# Patient Record
Sex: FEMALE | Race: WHITE | Employment: OTHER | URBAN - NONMETROPOLITAN AREA
[De-identification: names, ages, dates, MRNs, and addresses within clinical notes are randomized per-mention and may not be internally consistent; named-entity substitution may affect disease eponyms.]

---

## 2024-05-28 ENCOUNTER — HOSPITAL ENCOUNTER (INPATIENT)
Age: 69
LOS: 7 days | Discharge: HOME | DRG: 417 | End: 2024-06-04
Attending: STUDENT IN AN ORGANIZED HEALTH CARE EDUCATION/TRAINING PROGRAM | Admitting: STUDENT IN AN ORGANIZED HEALTH CARE EDUCATION/TRAINING PROGRAM
Payer: MEDICARE

## 2024-05-28 DIAGNOSIS — E87.6 HYPOKALEMIA: ICD-10-CM

## 2024-05-28 DIAGNOSIS — K80.50 CHOLEDOCHOLITHIASIS: ICD-10-CM

## 2024-05-28 DIAGNOSIS — R60.9 EDEMA, UNSPECIFIED TYPE: Primary | ICD-10-CM

## 2024-05-28 LAB
ALBUMIN SERPL BCG-MCNC: 4.3 G/DL (ref 3.5–5.1)
ALP SERPL-CCNC: 74 U/L (ref 38–126)
ALT SERPL W/O P-5'-P-CCNC: 234 U/L (ref 11–66)
ANION GAP SERPL CALC-SCNC: 12 MEQ/L (ref 8–16)
AST SERPL-CCNC: 138 U/L (ref 5–40)
BASOPHILS ABSOLUTE: 0 THOU/MM3 (ref 0–0.1)
BASOPHILS NFR BLD AUTO: 0.1 %
BILIRUB SERPL-MCNC: 0.7 MG/DL (ref 0.3–1.2)
BUN SERPL-MCNC: 16 MG/DL (ref 7–22)
CALCIUM SERPL-MCNC: 9.3 MG/DL (ref 8.5–10.5)
CHLORIDE SERPL-SCNC: 101 MEQ/L (ref 98–111)
CO2 SERPL-SCNC: 26 MEQ/L (ref 23–33)
CREAT SERPL-MCNC: 0.5 MG/DL (ref 0.4–1.2)
DEPRECATED RDW RBC AUTO: 43.8 FL (ref 35–45)
EKG ATRIAL RATE: 71 BPM
EKG P AXIS: 70 DEGREES
EKG P-R INTERVAL: 150 MS
EKG Q-T INTERVAL: 402 MS
EKG QRS DURATION: 74 MS
EKG QTC CALCULATION (BAZETT): 436 MS
EKG R AXIS: -40 DEGREES
EKG T AXIS: 36 DEGREES
EKG VENTRICULAR RATE: 71 BPM
EOSINOPHIL NFR BLD AUTO: 0 %
EOSINOPHILS ABSOLUTE: 0 THOU/MM3 (ref 0–0.4)
ERYTHROCYTE [DISTWIDTH] IN BLOOD BY AUTOMATED COUNT: 12.1 % (ref 11.5–14.5)
GFR SERPL CREATININE-BSD FRML MDRD: > 90 ML/MIN/1.73M2
GLUCOSE SERPL-MCNC: 151 MG/DL (ref 70–108)
HCT VFR BLD AUTO: 47 % (ref 37–47)
HGB BLD-MCNC: 15.5 GM/DL (ref 12–16)
IMM GRANULOCYTES # BLD AUTO: 0.08 THOU/MM3 (ref 0–0.07)
IMM GRANULOCYTES NFR BLD AUTO: 0.4 %
LIPASE SERPL-CCNC: > 3000 U/L (ref 5.6–51.3)
LYMPHOCYTES ABSOLUTE: 0.2 THOU/MM3 (ref 1–4.8)
LYMPHOCYTES NFR BLD AUTO: 1.1 %
MCH RBC QN AUTO: 32.3 PG (ref 26–33)
MCHC RBC AUTO-ENTMCNC: 33 GM/DL (ref 32.2–35.5)
MCV RBC AUTO: 97.9 FL (ref 81–99)
MONOCYTES ABSOLUTE: 0.9 THOU/MM3 (ref 0.4–1.3)
MONOCYTES NFR BLD AUTO: 4.5 %
NEUTROPHILS ABSOLUTE: 19.2 THOU/MM3 (ref 1.8–7.7)
NEUTROPHILS NFR BLD AUTO: 93.9 %
NRBC BLD AUTO-RTO: 0 /100 WBC
PLATELET # BLD AUTO: 279 THOU/MM3 (ref 130–400)
PMV BLD AUTO: 10.5 FL (ref 9.4–12.4)
POTASSIUM SERPL-SCNC: 4.7 MEQ/L (ref 3.5–5.2)
PROT SERPL-MCNC: 7.5 G/DL (ref 6.1–8)
RBC # BLD AUTO: 4.8 MILL/MM3 (ref 4.2–5.4)
SODIUM SERPL-SCNC: 139 MEQ/L (ref 135–145)
WBC # BLD AUTO: 20.5 THOU/MM3 (ref 4.8–10.8)

## 2024-05-28 PROCEDURE — 93010 ELECTROCARDIOGRAM REPORT: CPT | Performed by: INTERNAL MEDICINE

## 2024-05-28 PROCEDURE — 6360000002 HC RX W HCPCS

## 2024-05-28 PROCEDURE — 85025 COMPLETE CBC W/AUTO DIFF WBC: CPT

## 2024-05-28 PROCEDURE — 2580000003 HC RX 258: Performed by: SURGERY

## 2024-05-28 PROCEDURE — 6370000000 HC RX 637 (ALT 250 FOR IP)

## 2024-05-28 PROCEDURE — 99223 1ST HOSP IP/OBS HIGH 75: CPT

## 2024-05-28 PROCEDURE — 80053 COMPREHEN METABOLIC PANEL: CPT

## 2024-05-28 PROCEDURE — 36415 COLL VENOUS BLD VENIPUNCTURE: CPT

## 2024-05-28 PROCEDURE — 83690 ASSAY OF LIPASE: CPT

## 2024-05-28 PROCEDURE — 87040 BLOOD CULTURE FOR BACTERIA: CPT

## 2024-05-28 PROCEDURE — 93005 ELECTROCARDIOGRAM TRACING: CPT

## 2024-05-28 PROCEDURE — 1200000003 HC TELEMETRY R&B

## 2024-05-28 RX ORDER — SODIUM CHLORIDE 9 MG/ML
INJECTION, SOLUTION INTRAVENOUS CONTINUOUS
Status: DISCONTINUED | OUTPATIENT
Start: 2024-05-28 | End: 2024-05-29

## 2024-05-28 RX ORDER — ACETAMINOPHEN 650 MG/1
650 SUPPOSITORY RECTAL EVERY 6 HOURS PRN
Status: DISCONTINUED | OUTPATIENT
Start: 2024-05-28 | End: 2024-06-04 | Stop reason: HOSPADM

## 2024-05-28 RX ORDER — POLYETHYLENE GLYCOL 3350 17 G/17G
17 POWDER, FOR SOLUTION ORAL DAILY PRN
Status: DISCONTINUED | OUTPATIENT
Start: 2024-05-28 | End: 2024-06-04 | Stop reason: HOSPADM

## 2024-05-28 RX ORDER — ONDANSETRON 4 MG/1
4 TABLET, ORALLY DISINTEGRATING ORAL EVERY 8 HOURS PRN
Status: DISCONTINUED | OUTPATIENT
Start: 2024-05-28 | End: 2024-06-04 | Stop reason: HOSPADM

## 2024-05-28 RX ORDER — SODIUM CHLORIDE 0.9 % (FLUSH) 0.9 %
10 SYRINGE (ML) INJECTION EVERY 12 HOURS SCHEDULED
Status: DISCONTINUED | OUTPATIENT
Start: 2024-05-28 | End: 2024-06-04 | Stop reason: HOSPADM

## 2024-05-28 RX ORDER — POTASSIUM CHLORIDE 7.45 MG/ML
10 INJECTION INTRAVENOUS PRN
Status: DISCONTINUED | OUTPATIENT
Start: 2024-05-28 | End: 2024-06-04 | Stop reason: HOSPADM

## 2024-05-28 RX ORDER — ONDANSETRON 2 MG/ML
4 INJECTION INTRAMUSCULAR; INTRAVENOUS EVERY 6 HOURS PRN
Status: DISCONTINUED | OUTPATIENT
Start: 2024-05-28 | End: 2024-06-04 | Stop reason: HOSPADM

## 2024-05-28 RX ORDER — POTASSIUM CHLORIDE 20 MEQ/1
40 TABLET, EXTENDED RELEASE ORAL PRN
Status: DISCONTINUED | OUTPATIENT
Start: 2024-05-28 | End: 2024-06-04 | Stop reason: HOSPADM

## 2024-05-28 RX ORDER — SODIUM CHLORIDE 9 MG/ML
INJECTION, SOLUTION INTRAVENOUS PRN
Status: DISCONTINUED | OUTPATIENT
Start: 2024-05-28 | End: 2024-06-04 | Stop reason: HOSPADM

## 2024-05-28 RX ORDER — AMLODIPINE BESYLATE 5 MG/1
5 TABLET ORAL DAILY
COMMUNITY

## 2024-05-28 RX ORDER — AMLODIPINE BESYLATE 5 MG/1
5 TABLET ORAL DAILY
Status: DISCONTINUED | OUTPATIENT
Start: 2024-05-28 | End: 2024-06-04 | Stop reason: HOSPADM

## 2024-05-28 RX ORDER — ACETAMINOPHEN 325 MG/1
650 TABLET ORAL EVERY 6 HOURS PRN
Status: DISCONTINUED | OUTPATIENT
Start: 2024-05-28 | End: 2024-06-04 | Stop reason: HOSPADM

## 2024-05-28 RX ORDER — MAGNESIUM SULFATE IN WATER 40 MG/ML
2000 INJECTION, SOLUTION INTRAVENOUS PRN
Status: DISCONTINUED | OUTPATIENT
Start: 2024-05-28 | End: 2024-06-04 | Stop reason: HOSPADM

## 2024-05-28 RX ORDER — SODIUM CHLORIDE 0.9 % (FLUSH) 0.9 %
10 SYRINGE (ML) INJECTION PRN
Status: DISCONTINUED | OUTPATIENT
Start: 2024-05-28 | End: 2024-06-04 | Stop reason: HOSPADM

## 2024-05-28 RX ORDER — ENOXAPARIN SODIUM 100 MG/ML
40 INJECTION SUBCUTANEOUS DAILY
Status: DISCONTINUED | OUTPATIENT
Start: 2024-05-29 | End: 2024-06-02

## 2024-05-28 RX ADMIN — SODIUM CHLORIDE: 9 INJECTION, SOLUTION INTRAVENOUS at 21:35

## 2024-05-28 RX ADMIN — HYDROMORPHONE HYDROCHLORIDE 0.5 MG: 1 INJECTION, SOLUTION INTRAMUSCULAR; INTRAVENOUS; SUBCUTANEOUS at 21:41

## 2024-05-28 RX ADMIN — ONDANSETRON 4 MG: 2 INJECTION INTRAMUSCULAR; INTRAVENOUS at 21:42

## 2024-05-28 ASSESSMENT — PAIN SCALES - GENERAL
PAINLEVEL_OUTOF10: 0
PAINLEVEL_OUTOF10: 9

## 2024-05-28 ASSESSMENT — PAIN DESCRIPTION - LOCATION: LOCATION: ABDOMEN

## 2024-05-28 ASSESSMENT — PAIN - FUNCTIONAL ASSESSMENT: PAIN_FUNCTIONAL_ASSESSMENT: PREVENTS OR INTERFERES SOME ACTIVE ACTIVITIES AND ADLS

## 2024-05-28 ASSESSMENT — PAIN DESCRIPTION - ONSET: ONSET: ON-GOING

## 2024-05-28 ASSESSMENT — PAIN DESCRIPTION - FREQUENCY: FREQUENCY: CONTINUOUS

## 2024-05-28 ASSESSMENT — PAIN DESCRIPTION - PAIN TYPE: TYPE: ACUTE PAIN

## 2024-05-28 ASSESSMENT — PAIN DESCRIPTION - DESCRIPTORS: DESCRIPTORS: ACHING

## 2024-05-28 ASSESSMENT — PAIN DESCRIPTION - ORIENTATION: ORIENTATION: LEFT

## 2024-05-29 ENCOUNTER — APPOINTMENT (OUTPATIENT)
Dept: GENERAL RADIOLOGY | Age: 69
DRG: 417 | End: 2024-05-29
Attending: STUDENT IN AN ORGANIZED HEALTH CARE EDUCATION/TRAINING PROGRAM
Payer: MEDICARE

## 2024-05-29 ENCOUNTER — APPOINTMENT (OUTPATIENT)
Dept: MRI IMAGING | Age: 69
DRG: 417 | End: 2024-05-29
Attending: STUDENT IN AN ORGANIZED HEALTH CARE EDUCATION/TRAINING PROGRAM
Payer: MEDICARE

## 2024-05-29 PROBLEM — E78.5 HYPERLIPIDEMIA: Status: ACTIVE | Noted: 2022-11-16

## 2024-05-29 PROBLEM — Z96.641 PRESENCE OF RIGHT ARTIFICIAL HIP JOINT: Status: ACTIVE | Noted: 2024-05-29

## 2024-05-29 PROBLEM — R10.11 RUQ PAIN: Status: ACTIVE | Noted: 2024-05-29

## 2024-05-29 PROBLEM — E04.2 MULTIPLE THYROID NODULES: Status: ACTIVE | Noted: 2022-08-10

## 2024-05-29 PROBLEM — K85.10 ACUTE BILIARY PANCREATITIS WITHOUT INFECTION OR NECROSIS: Status: ACTIVE | Noted: 2024-05-29

## 2024-05-29 LAB
ALBUMIN SERPL BCG-MCNC: 3.7 G/DL (ref 3.5–5.1)
ALP SERPL-CCNC: 70 U/L (ref 38–126)
ALT SERPL W/O P-5'-P-CCNC: 161 U/L (ref 11–66)
ANION GAP SERPL CALC-SCNC: 10 MEQ/L (ref 8–16)
AST SERPL-CCNC: 69 U/L (ref 5–40)
BASOPHILS ABSOLUTE: 0 THOU/MM3 (ref 0–0.1)
BASOPHILS NFR BLD AUTO: 0.2 %
BILIRUB SERPL-MCNC: 0.6 MG/DL (ref 0.3–1.2)
BUN SERPL-MCNC: 16 MG/DL (ref 7–22)
CALCIUM SERPL-MCNC: 8 MG/DL (ref 8.5–10.5)
CHLORIDE SERPL-SCNC: 107 MEQ/L (ref 98–111)
CO2 SERPL-SCNC: 27 MEQ/L (ref 23–33)
CREAT SERPL-MCNC: 0.6 MG/DL (ref 0.4–1.2)
DEPRECATED RDW RBC AUTO: 45.5 FL (ref 35–45)
EOSINOPHIL NFR BLD AUTO: 0 %
EOSINOPHILS ABSOLUTE: 0 THOU/MM3 (ref 0–0.4)
ERYTHROCYTE [DISTWIDTH] IN BLOOD BY AUTOMATED COUNT: 12.3 % (ref 11.5–14.5)
GFR SERPL CREATININE-BSD FRML MDRD: > 90 ML/MIN/1.73M2
GLUCOSE SERPL-MCNC: 119 MG/DL (ref 70–108)
HCT VFR BLD AUTO: 45.2 % (ref 37–47)
HGB BLD-MCNC: 15 GM/DL (ref 12–16)
IMM GRANULOCYTES # BLD AUTO: 0.12 THOU/MM3 (ref 0–0.07)
IMM GRANULOCYTES NFR BLD AUTO: 0.7 %
INR PPP: 1.16 (ref 0.85–1.13)
LYMPHOCYTES ABSOLUTE: 0.6 THOU/MM3 (ref 1–4.8)
LYMPHOCYTES NFR BLD AUTO: 3.6 %
MCH RBC QN AUTO: 33.3 PG (ref 26–33)
MCHC RBC AUTO-ENTMCNC: 33.2 GM/DL (ref 32.2–35.5)
MCV RBC AUTO: 100.2 FL (ref 81–99)
MONOCYTES ABSOLUTE: 0.8 THOU/MM3 (ref 0.4–1.3)
MONOCYTES NFR BLD AUTO: 4.7 %
NEUTROPHILS ABSOLUTE: 15.9 THOU/MM3 (ref 1.8–7.7)
NEUTROPHILS NFR BLD AUTO: 90.8 %
NRBC BLD AUTO-RTO: 0 /100 WBC
PLATELET # BLD AUTO: 234 THOU/MM3 (ref 130–400)
PMV BLD AUTO: 10.6 FL (ref 9.4–12.4)
POTASSIUM SERPL-SCNC: 4.3 MEQ/L (ref 3.5–5.2)
POTASSIUM SERPL-SCNC: 5.5 MEQ/L (ref 3.5–5.2)
PROT SERPL-MCNC: 6.1 G/DL (ref 6.1–8)
RBC # BLD AUTO: 4.51 MILL/MM3 (ref 4.2–5.4)
REASON FOR REJECTION: NORMAL
REASON FOR REJECTION: NORMAL
REJECTED TEST: NORMAL
REJECTED TEST: NORMAL
SODIUM SERPL-SCNC: 144 MEQ/L (ref 135–145)
WBC # BLD AUTO: 17.5 THOU/MM3 (ref 4.8–10.8)

## 2024-05-29 PROCEDURE — 6360000002 HC RX W HCPCS

## 2024-05-29 PROCEDURE — 2580000003 HC RX 258

## 2024-05-29 PROCEDURE — 36415 COLL VENOUS BLD VENIPUNCTURE: CPT

## 2024-05-29 PROCEDURE — 80053 COMPREHEN METABOLIC PANEL: CPT

## 2024-05-29 PROCEDURE — 2580000003 HC RX 258: Performed by: SURGERY

## 2024-05-29 PROCEDURE — 6360000002 HC RX W HCPCS: Performed by: NURSE PRACTITIONER

## 2024-05-29 PROCEDURE — 1200000003 HC TELEMETRY R&B

## 2024-05-29 PROCEDURE — 85025 COMPLETE CBC W/AUTO DIFF WBC: CPT

## 2024-05-29 PROCEDURE — A9579 GAD-BASE MR CONTRAST NOS,1ML: HCPCS | Performed by: SURGERY

## 2024-05-29 PROCEDURE — 2580000003 HC RX 258: Performed by: NURSE PRACTITIONER

## 2024-05-29 PROCEDURE — 85610 PROTHROMBIN TIME: CPT

## 2024-05-29 PROCEDURE — 71045 X-RAY EXAM CHEST 1 VIEW: CPT

## 2024-05-29 PROCEDURE — 6360000004 HC RX CONTRAST MEDICATION: Performed by: SURGERY

## 2024-05-29 PROCEDURE — 99232 SBSQ HOSP IP/OBS MODERATE 35: CPT | Performed by: NURSE PRACTITIONER

## 2024-05-29 PROCEDURE — 6370000000 HC RX 637 (ALT 250 FOR IP)

## 2024-05-29 PROCEDURE — 74183 MRI ABD W/O CNTR FLWD CNTR: CPT

## 2024-05-29 PROCEDURE — 84132 ASSAY OF SERUM POTASSIUM: CPT

## 2024-05-29 PROCEDURE — 99223 1ST HOSP IP/OBS HIGH 75: CPT | Performed by: SURGERY

## 2024-05-29 PROCEDURE — C9113 INJ PANTOPRAZOLE SODIUM, VIA: HCPCS | Performed by: NURSE PRACTITIONER

## 2024-05-29 RX ORDER — PANTOPRAZOLE SODIUM 40 MG/10ML
40 INJECTION, POWDER, LYOPHILIZED, FOR SOLUTION INTRAVENOUS DAILY
Status: DISCONTINUED | OUTPATIENT
Start: 2024-05-29 | End: 2024-06-04

## 2024-05-29 RX ORDER — SODIUM CHLORIDE, SODIUM LACTATE, POTASSIUM CHLORIDE, CALCIUM CHLORIDE 600; 310; 30; 20 MG/100ML; MG/100ML; MG/100ML; MG/100ML
INJECTION, SOLUTION INTRAVENOUS CONTINUOUS
Status: DISCONTINUED | OUTPATIENT
Start: 2024-05-29 | End: 2024-06-02

## 2024-05-29 RX ADMIN — PIPERACILLIN AND TAZOBACTAM 3375 MG: 3; .375 INJECTION, POWDER, FOR SOLUTION INTRAVENOUS at 23:09

## 2024-05-29 RX ADMIN — SODIUM CHLORIDE, POTASSIUM CHLORIDE, SODIUM LACTATE AND CALCIUM CHLORIDE: 600; 310; 30; 20 INJECTION, SOLUTION INTRAVENOUS at 22:17

## 2024-05-29 RX ADMIN — PANTOPRAZOLE SODIUM 40 MG: 40 INJECTION, POWDER, FOR SOLUTION INTRAVENOUS at 12:49

## 2024-05-29 RX ADMIN — AMLODIPINE BESYLATE 5 MG: 5 TABLET ORAL at 08:08

## 2024-05-29 RX ADMIN — SODIUM CHLORIDE, POTASSIUM CHLORIDE, SODIUM LACTATE AND CALCIUM CHLORIDE: 600; 310; 30; 20 INJECTION, SOLUTION INTRAVENOUS at 09:04

## 2024-05-29 RX ADMIN — PIPERACILLIN AND TAZOBACTAM 3375 MG: 3; .375 INJECTION, POWDER, FOR SOLUTION INTRAVENOUS at 08:09

## 2024-05-29 RX ADMIN — GADOTERIDOL 10 ML: 279.3 INJECTION, SOLUTION INTRAVENOUS at 17:19

## 2024-05-29 RX ADMIN — PIPERACILLIN AND TAZOBACTAM 3375 MG: 3; .375 INJECTION, POWDER, FOR SOLUTION INTRAVENOUS at 14:53

## 2024-05-29 RX ADMIN — PIPERACILLIN AND TAZOBACTAM 3375 MG: 3; .375 INJECTION, POWDER, FOR SOLUTION INTRAVENOUS at 00:04

## 2024-05-29 RX ADMIN — SODIUM CHLORIDE: 9 INJECTION, SOLUTION INTRAVENOUS at 05:41

## 2024-05-29 ASSESSMENT — PAIN SCALES - GENERAL
PAINLEVEL_OUTOF10: 0
PAINLEVEL_OUTOF10: 0

## 2024-05-29 NOTE — CONSULTS
Consult History & Physical      Patient:  Leah Vanegas  YOB: 1955  MRN: [id number]     Acct: [id number]    Chief Complaint:  Choledocholithiasis, acute pancreatitis      Date of Service: Pt seen/examined in consultation on 5/29/2024    History Of Present Illness:      69 y.o. female who we are asked to see/evaluate by Hanh Ortiz APRN* for medical management of choledocholithiasis, acute pancreatitis. Patient presented to OSH yesterday for bilateral upper abdominal pain that started yesterday and progressively worsened. Patient was seen in the ED last week for the same thing and was discharged home. Patient denies aggravating factors, states vomiting helps alleviate some of the pain. The pain is intermittent. She has associated nausea and vomiting, denies hematemesis. Denies fever or chills. At OSH, she was noted to have elevated LFTs and lipase level. CT A/P at OSH negative for acute process. US RUQ at OSH demonstrated cholelithiasis without evidence of acute cholecystitis, CBD WNL. Reportedly an MRI was done at OSH which demonstrated choledocholithiasis, however this is unavailable for review. LFTs are improving. Bilirubin and alk phos WNL. Patient denies prior history of abdominal surgeries and EGD. She had a colonoscopy in February of this year, at OSH, which she states was normal. She denies anticoagulation and antiplatelet use.    Past Medical History:    History reviewed. No pertinent past medical history.    Home Medications:  Prior to Admission medications    Medication Sig Start Date End Date Taking? Authorizing Provider   amLODIPine (NORVASC) 5 MG tablet Take 1 tablet by mouth daily   Yes Provider, MD Serafin       Surgical History:  No past surgical history on file.    Family History:  No family history on file.    Past GI History:  Colonoscopy, cholelithiasis    Allergies:  Codeine    Social History:   TOBACCO:   has no history on file for tobacco use.  ETOH:   has no

## 2024-05-29 NOTE — PROCEDURES
PROCEDURE NOTE  Date: 5/29/2024   Name: Leah Vanegas  YOB: 1955    Procedures  12 lead EKG completed. Results handed to Radha MIRELES.

## 2024-05-29 NOTE — CONSULTS
[facility]  General Surgery Consultation - Sirena Adams PA-C  On behalf of Dr. Hawkins    Pt Name: Leah Vanegas  MRN: [id number]  YOB: 1955  Date of evaluation: 5/28/2024  Primary Care Physician: Florence Lima MD  Patient evaluated at the request of  Bertha Escobar PA-C  Reason for evaluation: Abdominal pain, N & V   IMPRESSIONS:   Choledocholithiasis, acute pancreatitis  History of HTN  RECOMMENDATIONS:   Admit patient  IVF   Pain meds prn  NPO  SUBJECTIVE:   History of Chief Complaint:    Leah is a 69 y.o.female who presents with abdominal pain. She was admitted to [facility] overnight on Friday May 24 for similar symptoms. She had F/U appt with surgeon. This morning pain and cramping began again. The pain is described as cramping, and is moderate to severe in intensity. Pain is located in the epigastric area, RUQ, lower quadrants and lower left back. Abdominal pain with radiation to the back and at times pain will radiate from the back to her abdomen.  Onset was 1 day ago. Symptoms have been gradually worsening since. Aggravating factors include movement and standing. Alleviating factors include recumbency. Associated symptoms include nausea and vomiting. She denies constipation and diarrhea. She admits to history of pancreatitis and denies history of hepatitis, inflammatory bowel disease, jaundice, colitis, and ulcer disease. Previous studies include CT scan, MRI, and MRCP.    Past Medical History   has no past medical history on file.  Past Surgical History   has no past surgical history on file.  Medications  Prior to Admission medications    Medication Sig Start Date End Date Taking? Authorizing Provider   amLODIPine (NORVASC) 5 MG tablet Take 1 tablet by mouth daily   Yes Provider, MD Serafin    Scheduled Meds:   amLODIPine  5 mg Oral Daily    sodium chloride flush  10 mL IntraVENous 2 times per day    enoxaparin  40 mg SubCUTAneous Daily     Continuous

## 2024-05-29 NOTE — PLAN OF CARE
Problem: Discharge Planning  Goal: Discharge to home or other facility with appropriate resources  5/28/2024 2320 by Radha Morales, RN  Outcome: Progressing  5/28/2024 1820 by Caron Mark RN  Outcome: Progressing     Problem: ABCDS Injury Assessment  Goal: Absence of physical injury  Outcome: Progressing     Problem: Pain  Goal: Verbalizes/displays adequate comfort level or baseline comfort level  Outcome: Progressing   Care plan reviewed with patient.  Patient verbalizes understanding of the plan of care and contribute to goal setting.

## 2024-05-29 NOTE — CARE COORDINATION
Case Management Assessment Initial Evaluation    Date/Time of Evaluation: 5/29/2024 9:31 AM  Assessment Completed by: Shraddha Mcnally RN    If patient is discharged prior to next notation, then this note serves as note for discharge by case management.    Patient Name: Leah Vanegas                   YOB: 1955  Diagnosis: Choledocholithiasis [K80.50]                   Date / Time: 5/28/2024  6:13 PM  Location: [address] [place] [address]     Patient Admission Status: Inpatient   Readmission Risk Low 0-14, Mod 15-19), High > 20: Readmission Risk Score: 5.9    Current PCP: Florence Lima MD    Additional Case Management Notes: Admitted with N/V. Recently [facility] with Lipase 88233 then. Presently Lipase 86575. MRCP shows stones. GI and Gen Surg consulted. IVF at 175/hr. IV Zosyn. Possible MRCP today.    Procedures: No    Imaging: No.    Patient Goals/Plan/Treatment Preferences: Met with Leah. She resides alone but has good family and friend support. She is independent. She drives, has a PCP and is insured. No discharge needs voiced at present time.        05/29/24 1031   Service Assessment   Patient Orientation Alert and Oriented   Cognition Alert   History Provided By Patient   Primary Caregiver Self   Support Systems Family Members;Friends/Neighbors   Patient's Healthcare Decision Maker is: Legal Next of Kin   PCP Verified by CM Yes   Last Visit to PCP Within last 3 months   Prior Functional Level Independent in ADLs/IADLs   Current Functional Level Independent in ADLs/IADLs   Can patient return to prior living arrangement Yes   Ability to make needs known: Good   Family able to assist with home care needs: Yes   Would you like for me to discuss the discharge plan with any other family members/significant others, and if so, who? No   Financial Resources Medicare   Community Resources None   Social/Functional History   Lives With Alone   Type of Home House   Home Layout One level   Active  Yes   Discharge

## 2024-05-30 LAB
ALBUMIN SERPL BCG-MCNC: 3 G/DL (ref 3.5–5.1)
ALP SERPL-CCNC: 158 U/L (ref 38–126)
ALT SERPL W/O P-5'-P-CCNC: 212 U/L (ref 11–66)
ANION GAP SERPL CALC-SCNC: 10 MEQ/L (ref 8–16)
AST SERPL-CCNC: 179 U/L (ref 5–40)
BASOPHILS ABSOLUTE: 0 THOU/MM3 (ref 0–0.1)
BASOPHILS NFR BLD AUTO: 0.2 %
BILIRUB SERPL-MCNC: 1.2 MG/DL (ref 0.3–1.2)
BUN SERPL-MCNC: 13 MG/DL (ref 7–22)
CALCIUM SERPL-MCNC: 7.7 MG/DL (ref 8.5–10.5)
CHLORIDE SERPL-SCNC: 105 MEQ/L (ref 98–111)
CO2 SERPL-SCNC: 26 MEQ/L (ref 23–33)
CREAT SERPL-MCNC: 0.4 MG/DL (ref 0.4–1.2)
DEPRECATED RDW RBC AUTO: 44.8 FL (ref 35–45)
EOSINOPHIL NFR BLD AUTO: 0 %
EOSINOPHILS ABSOLUTE: 0 THOU/MM3 (ref 0–0.4)
ERYTHROCYTE [DISTWIDTH] IN BLOOD BY AUTOMATED COUNT: 12.3 % (ref 11.5–14.5)
GFR SERPL CREATININE-BSD FRML MDRD: > 90 ML/MIN/1.73M2
GLUCOSE SERPL-MCNC: 95 MG/DL (ref 70–108)
HCT VFR BLD AUTO: 40.5 % (ref 37–47)
HGB BLD-MCNC: 13.2 GM/DL (ref 12–16)
IMM GRANULOCYTES # BLD AUTO: 0.14 THOU/MM3 (ref 0–0.07)
IMM GRANULOCYTES NFR BLD AUTO: 0.7 %
LYMPHOCYTES ABSOLUTE: 0.8 THOU/MM3 (ref 1–4.8)
LYMPHOCYTES NFR BLD AUTO: 3.8 %
MCH RBC QN AUTO: 32.4 PG (ref 26–33)
MCHC RBC AUTO-ENTMCNC: 32.6 GM/DL (ref 32.2–35.5)
MCV RBC AUTO: 99.5 FL (ref 81–99)
MONOCYTES ABSOLUTE: 0.8 THOU/MM3 (ref 0.4–1.3)
MONOCYTES NFR BLD AUTO: 4.1 %
NEUTROPHILS ABSOLUTE: 18.2 THOU/MM3 (ref 1.8–7.7)
NEUTROPHILS NFR BLD AUTO: 91.2 %
NRBC BLD AUTO-RTO: 0 /100 WBC
PLATELET # BLD AUTO: 212 THOU/MM3 (ref 130–400)
PMV BLD AUTO: 11 FL (ref 9.4–12.4)
POTASSIUM SERPL-SCNC: 3.6 MEQ/L (ref 3.5–5.2)
PROT SERPL-MCNC: 5.7 G/DL (ref 6.1–8)
RBC # BLD AUTO: 4.07 MILL/MM3 (ref 4.2–5.4)
SODIUM SERPL-SCNC: 141 MEQ/L (ref 135–145)
WBC # BLD AUTO: 20 THOU/MM3 (ref 4.8–10.8)

## 2024-05-30 PROCEDURE — 6370000000 HC RX 637 (ALT 250 FOR IP)

## 2024-05-30 PROCEDURE — 6360000002 HC RX W HCPCS: Performed by: NURSE PRACTITIONER

## 2024-05-30 PROCEDURE — 99233 SBSQ HOSP IP/OBS HIGH 50: CPT | Performed by: NURSE PRACTITIONER

## 2024-05-30 PROCEDURE — C9113 INJ PANTOPRAZOLE SODIUM, VIA: HCPCS | Performed by: NURSE PRACTITIONER

## 2024-05-30 PROCEDURE — 80053 COMPREHEN METABOLIC PANEL: CPT

## 2024-05-30 PROCEDURE — 36415 COLL VENOUS BLD VENIPUNCTURE: CPT

## 2024-05-30 PROCEDURE — 85025 COMPLETE CBC W/AUTO DIFF WBC: CPT

## 2024-05-30 PROCEDURE — 6360000002 HC RX W HCPCS

## 2024-05-30 PROCEDURE — 2580000003 HC RX 258

## 2024-05-30 PROCEDURE — 94669 MECHANICAL CHEST WALL OSCILL: CPT

## 2024-05-30 PROCEDURE — 2580000003 HC RX 258: Performed by: NURSE PRACTITIONER

## 2024-05-30 PROCEDURE — 1200000003 HC TELEMETRY R&B

## 2024-05-30 PROCEDURE — 99232 SBSQ HOSP IP/OBS MODERATE 35: CPT | Performed by: SURGERY

## 2024-05-30 RX ORDER — INDOMETHACIN 100 MG
100 SUPPOSITORY, RECTAL RECTAL ONCE
Status: COMPLETED | OUTPATIENT
Start: 2024-05-31 | End: 2024-05-31

## 2024-05-30 RX ORDER — INDOMETHACIN 100 MG
100 SUPPOSITORY, RECTAL RECTAL ONCE
Status: DISCONTINUED | OUTPATIENT
Start: 2024-05-30 | End: 2024-05-30

## 2024-05-30 RX ADMIN — PANTOPRAZOLE SODIUM 40 MG: 40 INJECTION, POWDER, FOR SOLUTION INTRAVENOUS at 08:23

## 2024-05-30 RX ADMIN — SODIUM CHLORIDE, POTASSIUM CHLORIDE, SODIUM LACTATE AND CALCIUM CHLORIDE: 600; 310; 30; 20 INJECTION, SOLUTION INTRAVENOUS at 04:09

## 2024-05-30 RX ADMIN — AMLODIPINE BESYLATE 5 MG: 5 TABLET ORAL at 08:23

## 2024-05-30 RX ADMIN — PIPERACILLIN AND TAZOBACTAM 3375 MG: 3; .375 INJECTION, POWDER, FOR SOLUTION INTRAVENOUS at 23:17

## 2024-05-30 RX ADMIN — PIPERACILLIN AND TAZOBACTAM 3375 MG: 3; .375 INJECTION, POWDER, FOR SOLUTION INTRAVENOUS at 15:48

## 2024-05-30 RX ADMIN — PIPERACILLIN AND TAZOBACTAM 3375 MG: 3; .375 INJECTION, POWDER, FOR SOLUTION INTRAVENOUS at 08:24

## 2024-05-30 ASSESSMENT — PAIN SCALES - GENERAL
PAINLEVEL_OUTOF10: 0
PAINLEVEL_OUTOF10: 0

## 2024-05-30 NOTE — CARE COORDINATION
5/30/24, 2:28 PM EDT    DISCHARGE ON GOING EVALUATION    Leah GARDNER [facility] day: 2  Location: [facility]-A Reason for admit: Choledocholithiasis [K80.50]     Procedures: No    Imaging since last note: 5-29-24 MRCP  IMPRESSION:  Study limited by body habitus and motion artifact  Cholelithiasis with common duct stones and ductal dilation  Bilateral pleural effusions and lower lobe atelectasis  Minimal right upper quadrant ascites    Barriers to Discharge: GI and Gen Surg following. WBC's up to 20.0 today.  and . Alk Phos 158. Choledocholithiasis with CBD dilation noted on MRCP. Planning ERCP on 5-31-24. IVF at 100/hr. Cont IV Zosyn.     PCP: Florence Lima MD  Readmission Risk Score: 6.6    Patient Goals/Plan/Treatment Preferences: From home alone but with good friend and family support. Cont to follow.

## 2024-05-30 NOTE — PLAN OF CARE
Problem: Discharge Planning  Goal: Discharge to home or other facility with appropriate resources  Outcome: Progressing     Problem: ABCDS Injury Assessment  Goal: Absence of physical injury  Outcome: Progressing     Problem: Pain  Goal: Verbalizes/displays adequate comfort level or baseline comfort level  Outcome: Progressing   Care plan reviewed with patient.  Patient verbalizes understanding of the plan of care and contribute to goal setting.

## 2024-05-31 ENCOUNTER — ANESTHESIA (OUTPATIENT)
Dept: ENDOSCOPY | Age: 69
End: 2024-05-31
Payer: MEDICARE

## 2024-05-31 ENCOUNTER — ANESTHESIA EVENT (OUTPATIENT)
Dept: ENDOSCOPY | Age: 69
End: 2024-05-31
Payer: MEDICARE

## 2024-05-31 ENCOUNTER — APPOINTMENT (OUTPATIENT)
Dept: GENERAL RADIOLOGY | Age: 69
DRG: 417 | End: 2024-05-31
Attending: STUDENT IN AN ORGANIZED HEALTH CARE EDUCATION/TRAINING PROGRAM
Payer: MEDICARE

## 2024-05-31 LAB
ALBUMIN SERPL BCG-MCNC: 3.1 G/DL (ref 3.5–5.1)
ALP SERPL-CCNC: 163 U/L (ref 38–126)
ALT SERPL W/O P-5'-P-CCNC: 214 U/L (ref 11–66)
AST SERPL-CCNC: 195 U/L (ref 5–40)
BILIRUB CONJ SERPL-MCNC: 0.5 MG/DL (ref 0–0.3)
BILIRUB SERPL-MCNC: 1.1 MG/DL (ref 0.3–1.2)
LIPASE SERPL-CCNC: 1003.4 U/L (ref 5.6–51.3)
PROT SERPL-MCNC: 5.8 G/DL (ref 6.1–8)

## 2024-05-31 PROCEDURE — 6360000002 HC RX W HCPCS

## 2024-05-31 PROCEDURE — 6360000002 HC RX W HCPCS: Performed by: NURSE ANESTHETIST, CERTIFIED REGISTERED

## 2024-05-31 PROCEDURE — 6370000000 HC RX 637 (ALT 250 FOR IP)

## 2024-05-31 PROCEDURE — 2580000003 HC RX 258

## 2024-05-31 PROCEDURE — 6370000000 HC RX 637 (ALT 250 FOR IP): Performed by: NURSE PRACTITIONER

## 2024-05-31 PROCEDURE — BF101ZZ FLUOROSCOPY OF BILE DUCTS USING LOW OSMOLAR CONTRAST: ICD-10-PCS | Performed by: INTERNAL MEDICINE

## 2024-05-31 PROCEDURE — 6360000002 HC RX W HCPCS: Performed by: NURSE PRACTITIONER

## 2024-05-31 PROCEDURE — 80076 HEPATIC FUNCTION PANEL: CPT

## 2024-05-31 PROCEDURE — C1769 GUIDE WIRE: HCPCS | Performed by: INTERNAL MEDICINE

## 2024-05-31 PROCEDURE — 3609014900 HC ERCP W/SPHINCTEROTOMY &/OR PAPILLOTOMY: Performed by: INTERNAL MEDICINE

## 2024-05-31 PROCEDURE — 2720000010 HC SURG SUPPLY STERILE: Performed by: INTERNAL MEDICINE

## 2024-05-31 PROCEDURE — 2500000003 HC RX 250 WO HCPCS: Performed by: NURSE ANESTHETIST, CERTIFIED REGISTERED

## 2024-05-31 PROCEDURE — 3700000001 HC ADD 15 MINUTES (ANESTHESIA): Performed by: INTERNAL MEDICINE

## 2024-05-31 PROCEDURE — 7100000001 HC PACU RECOVERY - ADDTL 15 MIN: Performed by: INTERNAL MEDICINE

## 2024-05-31 PROCEDURE — 99232 SBSQ HOSP IP/OBS MODERATE 35: CPT | Performed by: SURGERY

## 2024-05-31 PROCEDURE — 2580000003 HC RX 258: Performed by: NURSE PRACTITIONER

## 2024-05-31 PROCEDURE — 3609015200 HC ERCP REMOVE CALCULI/DEBRIS BILIARY/PANCREAS DUCT: Performed by: INTERNAL MEDICINE

## 2024-05-31 PROCEDURE — 7100000000 HC PACU RECOVERY - FIRST 15 MIN: Performed by: INTERNAL MEDICINE

## 2024-05-31 PROCEDURE — 99232 SBSQ HOSP IP/OBS MODERATE 35: CPT | Performed by: NURSE PRACTITIONER

## 2024-05-31 PROCEDURE — 3700000000 HC ANESTHESIA ATTENDED CARE: Performed by: INTERNAL MEDICINE

## 2024-05-31 PROCEDURE — 1200000000 HC SEMI PRIVATE

## 2024-05-31 PROCEDURE — 36415 COLL VENOUS BLD VENIPUNCTURE: CPT

## 2024-05-31 PROCEDURE — 83690 ASSAY OF LIPASE: CPT

## 2024-05-31 PROCEDURE — 74328 X-RAY BILE DUCT ENDOSCOPY: CPT

## 2024-05-31 PROCEDURE — 0F778DZ DILATION OF COMMON HEPATIC DUCT WITH INTRALUMINAL DEVICE, VIA NATURAL OR ARTIFICIAL OPENING ENDOSCOPIC: ICD-10-PCS | Performed by: INTERNAL MEDICINE

## 2024-05-31 PROCEDURE — C9113 INJ PANTOPRAZOLE SODIUM, VIA: HCPCS | Performed by: NURSE PRACTITIONER

## 2024-05-31 PROCEDURE — 94669 MECHANICAL CHEST WALL OSCILL: CPT

## 2024-05-31 RX ORDER — FENTANYL CITRATE 50 UG/ML
INJECTION, SOLUTION INTRAMUSCULAR; INTRAVENOUS PRN
Status: DISCONTINUED | OUTPATIENT
Start: 2024-05-31 | End: 2024-05-31 | Stop reason: SDUPTHER

## 2024-05-31 RX ORDER — IPRATROPIUM BROMIDE AND ALBUTEROL SULFATE 2.5; .5 MG/3ML; MG/3ML
SOLUTION RESPIRATORY (INHALATION)
Status: COMPLETED
Start: 2024-05-31 | End: 2024-05-31

## 2024-05-31 RX ORDER — PROPOFOL 10 MG/ML
INJECTION, EMULSION INTRAVENOUS PRN
Status: DISCONTINUED | OUTPATIENT
Start: 2024-05-31 | End: 2024-05-31 | Stop reason: SDUPTHER

## 2024-05-31 RX ORDER — ROCURONIUM BROMIDE 10 MG/ML
INJECTION, SOLUTION INTRAVENOUS PRN
Status: DISCONTINUED | OUTPATIENT
Start: 2024-05-31 | End: 2024-05-31 | Stop reason: SDUPTHER

## 2024-05-31 RX ORDER — IPRATROPIUM BROMIDE AND ALBUTEROL SULFATE 2.5; .5 MG/3ML; MG/3ML
1 SOLUTION RESPIRATORY (INHALATION) ONCE
Status: COMPLETED | OUTPATIENT
Start: 2024-05-31 | End: 2024-05-31

## 2024-05-31 RX ORDER — EPHEDRINE SULFATE/0.9% NACL/PF 25 MG/5 ML
SYRINGE (ML) INTRAVENOUS PRN
Status: DISCONTINUED | OUTPATIENT
Start: 2024-05-31 | End: 2024-05-31 | Stop reason: SDUPTHER

## 2024-05-31 RX ORDER — LIDOCAINE HYDROCHLORIDE 20 MG/ML
INJECTION, SOLUTION EPIDURAL; INFILTRATION; INTRACAUDAL; PERINEURAL PRN
Status: DISCONTINUED | OUTPATIENT
Start: 2024-05-31 | End: 2024-05-31 | Stop reason: SDUPTHER

## 2024-05-31 RX ORDER — ONDANSETRON 2 MG/ML
INJECTION INTRAMUSCULAR; INTRAVENOUS PRN
Status: DISCONTINUED | OUTPATIENT
Start: 2024-05-31 | End: 2024-05-31 | Stop reason: SDUPTHER

## 2024-05-31 RX ORDER — DEXAMETHASONE SODIUM PHOSPHATE 4 MG/ML
INJECTION, SOLUTION INTRA-ARTICULAR; INTRALESIONAL; INTRAMUSCULAR; INTRAVENOUS; SOFT TISSUE PRN
Status: DISCONTINUED | OUTPATIENT
Start: 2024-05-31 | End: 2024-05-31 | Stop reason: SDUPTHER

## 2024-05-31 RX ADMIN — EPHEDRINE SULFATE 5 MG: 5 INJECTION INTRAVENOUS at 15:53

## 2024-05-31 RX ADMIN — PHENYLEPHRINE HYDROCHLORIDE 200 MCG: 10 INJECTION INTRAVENOUS at 15:29

## 2024-05-31 RX ADMIN — ROCURONIUM BROMIDE 40 MG: 10 INJECTION INTRAVENOUS at 15:16

## 2024-05-31 RX ADMIN — IPRATROPIUM BROMIDE AND ALBUTEROL SULFATE 1 DOSE: 2.5; .5 SOLUTION RESPIRATORY (INHALATION) at 16:19

## 2024-05-31 RX ADMIN — PIPERACILLIN AND TAZOBACTAM 3375 MG: 3; .375 INJECTION, POWDER, FOR SOLUTION INTRAVENOUS at 22:08

## 2024-05-31 RX ADMIN — PIPERACILLIN AND TAZOBACTAM 3375 MG: 3; .375 INJECTION, POWDER, FOR SOLUTION INTRAVENOUS at 08:47

## 2024-05-31 RX ADMIN — SUGAMMADEX 100 MG: 100 INJECTION, SOLUTION INTRAVENOUS at 16:10

## 2024-05-31 RX ADMIN — IPRATROPIUM BROMIDE AND ALBUTEROL SULFATE 1 DOSE: .5; 3 SOLUTION RESPIRATORY (INHALATION) at 16:19

## 2024-05-31 RX ADMIN — LIDOCAINE HYDROCHLORIDE 80 MG: 20 INJECTION, SOLUTION EPIDURAL; INFILTRATION; INTRACAUDAL; PERINEURAL at 15:16

## 2024-05-31 RX ADMIN — PHENYLEPHRINE HYDROCHLORIDE 100 MCG: 10 INJECTION INTRAVENOUS at 15:23

## 2024-05-31 RX ADMIN — EPHEDRINE SULFATE 10 MG: 5 INJECTION INTRAVENOUS at 15:31

## 2024-05-31 RX ADMIN — FENTANYL CITRATE 100 MCG: 50 INJECTION, SOLUTION INTRAMUSCULAR; INTRAVENOUS at 15:16

## 2024-05-31 RX ADMIN — DEXAMETHASONE SODIUM PHOSPHATE 8 MG: 4 INJECTION, SOLUTION INTRAMUSCULAR; INTRAVENOUS at 15:33

## 2024-05-31 RX ADMIN — AMLODIPINE BESYLATE 5 MG: 5 TABLET ORAL at 08:47

## 2024-05-31 RX ADMIN — SODIUM CHLORIDE, POTASSIUM CHLORIDE, SODIUM LACTATE AND CALCIUM CHLORIDE: 600; 310; 30; 20 INJECTION, SOLUTION INTRAVENOUS at 00:51

## 2024-05-31 RX ADMIN — SODIUM CHLORIDE, PRESERVATIVE FREE 10 ML: 5 INJECTION INTRAVENOUS at 08:47

## 2024-05-31 RX ADMIN — Medication 100 MG: at 14:04

## 2024-05-31 RX ADMIN — ONDANSETRON 4 MG: 2 INJECTION INTRAMUSCULAR; INTRAVENOUS at 15:50

## 2024-05-31 RX ADMIN — PIPERACILLIN AND TAZOBACTAM 3375 MG: 3; .375 INJECTION, POWDER, FOR SOLUTION INTRAVENOUS at 17:41

## 2024-05-31 RX ADMIN — PROPOFOL 150 MG: 10 INJECTION, EMULSION INTRAVENOUS at 15:16

## 2024-05-31 RX ADMIN — SUGAMMADEX 200 MG: 100 INJECTION, SOLUTION INTRAVENOUS at 15:54

## 2024-05-31 RX ADMIN — EPHEDRINE SULFATE 10 MG: 5 INJECTION INTRAVENOUS at 15:50

## 2024-05-31 RX ADMIN — SUGAMMADEX 100 MG: 100 INJECTION, SOLUTION INTRAVENOUS at 16:05

## 2024-05-31 RX ADMIN — PANTOPRAZOLE SODIUM 40 MG: 40 INJECTION, POWDER, FOR SOLUTION INTRAVENOUS at 08:47

## 2024-05-31 RX ADMIN — PHENYLEPHRINE HYDROCHLORIDE 200 MCG: 10 INJECTION INTRAVENOUS at 15:43

## 2024-05-31 ASSESSMENT — PAIN - FUNCTIONAL ASSESSMENT
PAIN_FUNCTIONAL_ASSESSMENT: NONE - DENIES PAIN
PAIN_FUNCTIONAL_ASSESSMENT: NONE - DENIES PAIN

## 2024-05-31 NOTE — H&P
[facility]  Sedation/Analgesia History & Physical    Patient: Leah Vaneags : 1955  Med Rec#: [id number] Acc#: [id number]   Provider Performing Procedure: Gabriel Amanda MD  Primary Care Physician: Florence Lima MD    PRE-PROCEDURE   Full CODE [x]Yes  DNR-CCA/DNR-CC []Yes   Brief History/Pre-Procedure Diagnosis:cbd stones          MEDICAL HISTORY  []CAD/Valve  []Liver Disease  []Lung Disease []Diabetes  []Hypertension []Renal Disease  []Additional information:       has no past medical history on file.    SURGICAL HISTORY   has no past surgical history on file.  Additional information:       ALLERGIES   Allergies as of 2024    (No Known Allergies)     Additional information:       MEDICATIONS   Coumadin Use Last 7 Days [x]No []Yes  Antiplatelet drug therapy use last 7 days  [x]No []Yes  Other anticoagulant use last 7 days  [x]No []Yes    Current Facility-Administered Medications:     lactated ringers IV soln infusion, , IntraVENous, Continuous, Hanh Ortiz APRN - CNP, Last Rate: 50 mL/hr at 24 0852, Rate Change at 24 0852    pantoprazole (PROTONIX) injection 40 mg, 40 mg, IntraVENous, Daily, Maggie Alvarado APRN - CNP, 40 mg at 24 0847    amLODIPine (NORVASC) tablet 5 mg, 5 mg, Oral, Daily, Bertha Escobar PA-SHAN, 5 mg at 24 0847    sodium chloride flush 0.9 % injection 10 mL, 10 mL, IntraVENous, 2 times per day, Bertha Escobar PA-C, 10 mL at 24 0847    sodium chloride flush 0.9 % injection 10 mL, 10 mL, IntraVENous, PRN, Bertha Escobar PA-C    0.9 % sodium chloride infusion, , IntraVENous, PRN, Bertha Escobar PA-SHAN    potassium chloride (KLOR-CON M) extended release tablet 40 mEq, 40 mEq, Oral, PRN **OR** potassium bicarb-citric acid (EFFER-K) effervescent tablet 40 mEq, 40 mEq, Oral, PRN **OR** potassium chloride 10 mEq/100 mL IVPB (Peripheral Line), 10 mEq, IntraVENous, PRN, Bertha Escobar PA-C    magnesium sulfate 2000 mg in 50 mL 
nervous/anxious.         PHYSICAL EXAM:  BP (!) 153/81   Pulse 80   Temp 98 °F (36.7 °C) (Oral)   Resp 18   Ht 1.524 m (5')   Wt 62.1 kg (137 lb)   SpO2 96%   BMI 26.76 kg/m²   General appearance: Acutely ill appearing. In mild distress from pain. Appears stated age and cooperative.  Skin: Skin color, texture, turgor normal.  No rashes or lesions.  HEENT: Normal cephalic, atraumatic without obvious deformity. Pupils equal, round, and reactive to light.  Extra-ocular muscles intact. Conjunctivae/corneas clear.  Neck: Trachea midline. Supple, with full range of motion.   Cardiovascular: Regular rate and rhythm with normal S1/S2. No murmurs, rubs or gallops.  Respiratory:  Normal respiratory effort. Clear to auscultation, bilaterally without rales, wheezes, or rhonchi.  Abdomen: Significant generalized tenderness to palpation, with guarding. More tender in the RUQ, epigastric region. Non-distended. Normal bowel sounds.   Musculoskeletal:  No weakness or instability noted.  No edema, erythema, or gross deformity noted.   Vascular:  Pulses +2 palpable, equal bilaterally.  Neurologic:  Neurovascularly intact without any focal sensory/motor deficits. Cranial nerves: II-XII grossly intact.   Psychiatric: Alert and oriented, thought content appropriate, normal insight    Labs:   No results for input(s): \"WBC\", \"HGB\", \"HCT\", \"PLT\" in the last 72 hours.  No results for input(s): \"NA\", \"K\", \"CL\", \"CO2\", \"BUN\", \"CREATININE\", \"CALCIUM\", \"PHOS\" in the last 72 hours.    Invalid input(s): \"MAGNES\"  No results for input(s): \"AST\", \"ALT\", \"BILIDIR\", \"BILITOT\", \"ALKPHOS\" in the last 72 hours.  No results for input(s): \"INR\" in the last 72 hours.  No results for input(s): \"CKTOTAL\", \"TROPONINI\" in the last 72 hours.    Urinalysis:    No results found for: \"NITRU\", \"WBCUA\", \"BACTERIA\", \"RBCUA\", \"BLOODU\", \"SPECGRAV\", \"GLUCOSEU\"    Radiology:   No orders to display     No results found.    EKG:  ordered    Electronically signed by

## 2024-05-31 NOTE — ANESTHESIA PRE PROCEDURE
anesthetic complications:   Airway: Mallampati: III  TM distance: >3 FB   Neck ROM: full  Mouth opening: > = 3 FB   Dental:          Pulmonary:   (+)           asthma: exercise-induced asthma, allergic asthma,                            Cardiovascular:  Exercise tolerance: good (>4 METS)  (+) hypertension:, hyperlipidemia                  Neuro/Psych:   Negative Neuro/Psych ROS              GI/Hepatic/Renal:            ROS comment: ERCP.   Endo/Other: Negative Endo/Other ROS                    Abdominal:             Vascular: negative vascular ROS.         Other Findings:             Anesthesia Plan      general     ASA 2       Induction: intravenous.    MIPS: Postoperative opioids intended and Prophylactic antiemetics administered.  Anesthetic plan and risks discussed with patient.      Plan discussed with attending.                    Lois Lara, APRN - CRNA   5/31/2024

## 2024-05-31 NOTE — ANESTHESIA POSTPROCEDURE EVALUATION
Department of Anesthesiology  Postprocedure Note    Patient: Leah Vanegas  MRN: [id number]  YOB: 1955  Date of evaluation: 5/31/2024    Procedure Summary       Date: 05/31/24 Room / Location: [place] [address] / [facility]    Anesthesia Start: 1509 Anesthesia Stop: 1621    Procedures:       ENDOSCOPIC RETROGRADE CHOLANGIOPANCREATOGRAPHY SPHINCTER/PAPILLOTOMY      ENDOSCOPIC RETROGRADE CHOLANGIOPANCREATOGRAPHY STONE REMOVAL Diagnosis:       Choledocholithiasis      (Choledocholithiasis [K80.50])    Surgeons: Gabriel Amanda MD Responsible Provider: Emmanuel Wasserman DO    Anesthesia Type: general ASA Status: 2            Anesthesia Type: No value filed.    Alice Phase I: Alice Score: 9    Alice Phase II:      Anesthesia Post Evaluation    Patient location during evaluation: PACU  Patient participation: complete - patient participated  Level of consciousness: awake and alert  Pain score: 2  Airway patency: patent  Nausea & Vomiting: no nausea and no vomiting  Cardiovascular status: hemodynamically stable and blood pressure returned to baseline  Respiratory status: spontaneous ventilation, acceptable and nasal cannula  Hydration status: stable  Pain management: adequate and satisfactory to patient    No notable events documented.

## 2024-06-01 ENCOUNTER — ANESTHESIA EVENT (OUTPATIENT)
Dept: OPERATING ROOM | Age: 69
End: 2024-06-01
Payer: MEDICARE

## 2024-06-01 LAB
ALBUMIN SERPL BCG-MCNC: 3.3 G/DL (ref 3.5–5.1)
ALP SERPL-CCNC: 118 U/L (ref 38–126)
ALT SERPL W/O P-5'-P-CCNC: 82 U/L (ref 11–66)
ANION GAP SERPL CALC-SCNC: 8 MEQ/L (ref 8–16)
AST SERPL-CCNC: 31 U/L (ref 5–40)
BILIRUB CONJ SERPL-MCNC: < 0.2 MG/DL (ref 0–0.3)
BILIRUB SERPL-MCNC: 0.6 MG/DL (ref 0.3–1.2)
BUN SERPL-MCNC: 11 MG/DL (ref 7–22)
CALCIUM SERPL-MCNC: 7.8 MG/DL (ref 8.5–10.5)
CHLORIDE SERPL-SCNC: 105 MEQ/L (ref 98–111)
CO2 SERPL-SCNC: 28 MEQ/L (ref 23–33)
CREAT SERPL-MCNC: 0.3 MG/DL (ref 0.4–1.2)
DEPRECATED RDW RBC AUTO: 47.1 FL (ref 35–45)
ERYTHROCYTE [DISTWIDTH] IN BLOOD BY AUTOMATED COUNT: 12.7 % (ref 11.5–14.5)
GFR SERPL CREATININE-BSD FRML MDRD: > 90 ML/MIN/1.73M2
GLUCOSE SERPL-MCNC: 143 MG/DL (ref 70–108)
HCT VFR BLD AUTO: 34.3 % (ref 37–47)
HGB BLD-MCNC: 11.2 GM/DL (ref 12–16)
MCH RBC QN AUTO: 32.8 PG (ref 26–33)
MCHC RBC AUTO-ENTMCNC: 32.7 GM/DL (ref 32.2–35.5)
MCV RBC AUTO: 100.6 FL (ref 81–99)
PLATELET # BLD AUTO: 202 THOU/MM3 (ref 130–400)
PMV BLD AUTO: 10.5 FL (ref 9.4–12.4)
POTASSIUM SERPL-SCNC: 4.1 MEQ/L (ref 3.5–5.2)
PROT SERPL-MCNC: 5.9 G/DL (ref 6.1–8)
RBC # BLD AUTO: 3.41 MILL/MM3 (ref 4.2–5.4)
SODIUM SERPL-SCNC: 141 MEQ/L (ref 135–145)
WBC # BLD AUTO: 12.3 THOU/MM3 (ref 4.8–10.8)

## 2024-06-01 PROCEDURE — 99233 SBSQ HOSP IP/OBS HIGH 50: CPT | Performed by: NURSE PRACTITIONER

## 2024-06-01 PROCEDURE — 2580000003 HC RX 258

## 2024-06-01 PROCEDURE — 36415 COLL VENOUS BLD VENIPUNCTURE: CPT

## 2024-06-01 PROCEDURE — 6370000000 HC RX 637 (ALT 250 FOR IP)

## 2024-06-01 PROCEDURE — 6360000002 HC RX W HCPCS

## 2024-06-01 PROCEDURE — 80076 HEPATIC FUNCTION PANEL: CPT

## 2024-06-01 PROCEDURE — 1200000000 HC SEMI PRIVATE

## 2024-06-01 PROCEDURE — 85027 COMPLETE CBC AUTOMATED: CPT

## 2024-06-01 PROCEDURE — 80048 BASIC METABOLIC PNL TOTAL CA: CPT

## 2024-06-01 PROCEDURE — C9113 INJ PANTOPRAZOLE SODIUM, VIA: HCPCS | Performed by: NURSE PRACTITIONER

## 2024-06-01 PROCEDURE — 2580000003 HC RX 258: Performed by: NURSE PRACTITIONER

## 2024-06-01 PROCEDURE — 99233 SBSQ HOSP IP/OBS HIGH 50: CPT | Performed by: SURGERY

## 2024-06-01 PROCEDURE — 6360000002 HC RX W HCPCS: Performed by: NURSE PRACTITIONER

## 2024-06-01 RX ORDER — FUROSEMIDE 10 MG/ML
20 INJECTION INTRAMUSCULAR; INTRAVENOUS ONCE
Status: COMPLETED | OUTPATIENT
Start: 2024-06-01 | End: 2024-06-01

## 2024-06-01 RX ADMIN — PIPERACILLIN AND TAZOBACTAM 3375 MG: 3; .375 INJECTION, POWDER, FOR SOLUTION INTRAVENOUS at 23:55

## 2024-06-01 RX ADMIN — PANTOPRAZOLE SODIUM 40 MG: 40 INJECTION, POWDER, FOR SOLUTION INTRAVENOUS at 09:35

## 2024-06-01 RX ADMIN — AMLODIPINE BESYLATE 5 MG: 5 TABLET ORAL at 09:27

## 2024-06-01 RX ADMIN — PIPERACILLIN AND TAZOBACTAM 3375 MG: 3; .375 INJECTION, POWDER, FOR SOLUTION INTRAVENOUS at 16:38

## 2024-06-01 RX ADMIN — SODIUM CHLORIDE, POTASSIUM CHLORIDE, SODIUM LACTATE AND CALCIUM CHLORIDE: 600; 310; 30; 20 INJECTION, SOLUTION INTRAVENOUS at 11:52

## 2024-06-01 RX ADMIN — PIPERACILLIN AND TAZOBACTAM 3375 MG: 3; .375 INJECTION, POWDER, FOR SOLUTION INTRAVENOUS at 05:59

## 2024-06-01 RX ADMIN — FUROSEMIDE 20 MG: 10 INJECTION, SOLUTION INTRAMUSCULAR; INTRAVENOUS at 09:35

## 2024-06-01 ASSESSMENT — PAIN SCALES - GENERAL
PAINLEVEL_OUTOF10: 0
PAINLEVEL_OUTOF10: 0

## 2024-06-01 NOTE — PROCEDURES
[address] [address]                             PROCEDURE NOTE      PATIENT NAME: RADHA SHIELDS              : 1955  MED REC NO: [id number]                       ROOM: [facility]  ACCOUNT NO: [id number]                       ADMIT DATE: 2024  PROVIDER: Gabriel Amanda MD      DATE OF PROCEDURE:      SURGEON:  Gabriel Amanda MD    PROCEDURES PERFORMED:  Endoscopic retrograde cholangiopancreatography plus sphincterotomy plus stone removal x7.    INDICATIONS FOR PROCEDURE:  The patient is with choledocholithiasis.  MRCP showing multiple small stones.  See the preop note and the consult and progress notes from yesterday for rest of clinicals.    ASA CLASSIFICATION:  III.    MEDICATION:  Per Anesthesia.    BIOPSY:  None.    PHOTOGRAPHS:  Yes.    BLOOD LOSS:  Minimal.    DESCRIPTION OF PROCEDURE:  Informed consent was obtained after explaining risks and benefits of the procedure, possible complication including bleeding, perforation, reaction to medicine, bleeding and perforation requiring surgery, the patient having pancreatitis and worsening of pancreatitis that carries an 18% risk, sepsis, aspiration, worsening of cardiopulmonary status, shock, and death were discussed. Afterward, the patient was intubated and put in prone position, and the patient was having prominent kyphoscoliosis and the therapeutic ERCP scope was advanced through oropharynx, esophagus, and stomach into the duodenum.  The ampulla was located and intubated and cholangiogram showed dilated bile duct and some dye in the gallbladder.  Multiple filling defects were seen and at that point in the standard fashion, sphincterotomy was done and after sphincterotomy, 9-12 balloon was used and multiple passes were made.  A total of 7 cholesterol stones came out and after that, occlusion cholangiogram was done which does not show any more stones and the patient tolerated

## 2024-06-02 ENCOUNTER — ANESTHESIA (OUTPATIENT)
Dept: OPERATING ROOM | Age: 69
End: 2024-06-02
Payer: MEDICARE

## 2024-06-02 LAB
ALBUMIN SERPL BCG-MCNC: 3.4 G/DL (ref 3.5–5.1)
ALP SERPL-CCNC: 102 U/L (ref 38–126)
ALT SERPL W/O P-5'-P-CCNC: 70 U/L (ref 11–66)
AST SERPL-CCNC: 28 U/L (ref 5–40)
BACTERIA BLD AEROBE CULT: NORMAL
BACTERIA BLD AEROBE CULT: NORMAL
BILIRUB CONJ SERPL-MCNC: < 0.2 MG/DL (ref 0–0.3)
BILIRUB SERPL-MCNC: 0.5 MG/DL (ref 0.3–1.2)
PROT SERPL-MCNC: 6.2 G/DL (ref 6.1–8)

## 2024-06-02 PROCEDURE — 6360000002 HC RX W HCPCS: Performed by: SURGERY

## 2024-06-02 PROCEDURE — C1889 IMPLANT/INSERT DEVICE, NOC: HCPCS | Performed by: SURGERY

## 2024-06-02 PROCEDURE — C9113 INJ PANTOPRAZOLE SODIUM, VIA: HCPCS | Performed by: SURGERY

## 2024-06-02 PROCEDURE — 3700000000 HC ANESTHESIA ATTENDED CARE: Performed by: SURGERY

## 2024-06-02 PROCEDURE — 7100000000 HC PACU RECOVERY - FIRST 15 MIN: Performed by: SURGERY

## 2024-06-02 PROCEDURE — 99232 SBSQ HOSP IP/OBS MODERATE 35: CPT | Performed by: NURSE PRACTITIONER

## 2024-06-02 PROCEDURE — 8E0W4CZ ROBOTIC ASSISTED PROCEDURE OF TRUNK REGION, PERCUTANEOUS ENDOSCOPIC APPROACH: ICD-10-PCS | Performed by: SURGERY

## 2024-06-02 PROCEDURE — S2900 ROBOTIC SURGICAL SYSTEM: HCPCS | Performed by: SURGERY

## 2024-06-02 PROCEDURE — 2580000003 HC RX 258: Performed by: REGISTERED NURSE

## 2024-06-02 PROCEDURE — 6370000000 HC RX 637 (ALT 250 FOR IP): Performed by: SURGERY

## 2024-06-02 PROCEDURE — 7100000001 HC PACU RECOVERY - ADDTL 15 MIN: Performed by: SURGERY

## 2024-06-02 PROCEDURE — 1200000000 HC SEMI PRIVATE

## 2024-06-02 PROCEDURE — 6360000002 HC RX W HCPCS: Performed by: REGISTERED NURSE

## 2024-06-02 PROCEDURE — 3700000001 HC ADD 15 MINUTES (ANESTHESIA): Performed by: SURGERY

## 2024-06-02 PROCEDURE — 3600000019 HC SURGERY ROBOT ADDTL 15MIN: Performed by: SURGERY

## 2024-06-02 PROCEDURE — 80076 HEPATIC FUNCTION PANEL: CPT

## 2024-06-02 PROCEDURE — 0FT44ZZ RESECTION OF GALLBLADDER, PERCUTANEOUS ENDOSCOPIC APPROACH: ICD-10-PCS | Performed by: SURGERY

## 2024-06-02 PROCEDURE — 2500000003 HC RX 250 WO HCPCS: Performed by: SURGERY

## 2024-06-02 PROCEDURE — 2709999900 HC NON-CHARGEABLE SUPPLY: Performed by: SURGERY

## 2024-06-02 PROCEDURE — 36415 COLL VENOUS BLD VENIPUNCTURE: CPT

## 2024-06-02 PROCEDURE — 3600000009 HC SURGERY ROBOT BASE: Performed by: SURGERY

## 2024-06-02 PROCEDURE — 47562 LAPAROSCOPIC CHOLECYSTECTOMY: CPT | Performed by: SURGERY

## 2024-06-02 PROCEDURE — 2580000003 HC RX 258: Performed by: SURGERY

## 2024-06-02 PROCEDURE — 88304 TISSUE EXAM BY PATHOLOGIST: CPT

## 2024-06-02 PROCEDURE — 2500000003 HC RX 250 WO HCPCS: Performed by: REGISTERED NURSE

## 2024-06-02 DEVICE — CLIP INT L POLYMER LOK LIG HEM O LOK (6EA/PK): Type: IMPLANTABLE DEVICE | Site: GALLBLADDER | Status: FUNCTIONAL

## 2024-06-02 RX ORDER — LIDOCAINE HCL/PF 100 MG/5ML
SYRINGE (ML) INJECTION PRN
Status: DISCONTINUED | OUTPATIENT
Start: 2024-06-02 | End: 2024-06-02 | Stop reason: SDUPTHER

## 2024-06-02 RX ORDER — PHENYLEPHRINE HCL IN 0.9% NACL 1 MG/10 ML
SYRINGE (ML) INTRAVENOUS PRN
Status: DISCONTINUED | OUTPATIENT
Start: 2024-06-02 | End: 2024-06-02 | Stop reason: SDUPTHER

## 2024-06-02 RX ORDER — SODIUM CHLORIDE 9 MG/ML
INJECTION, SOLUTION INTRAVENOUS CONTINUOUS PRN
Status: DISCONTINUED | OUTPATIENT
Start: 2024-06-02 | End: 2024-06-02 | Stop reason: SDUPTHER

## 2024-06-02 RX ORDER — ENOXAPARIN SODIUM 100 MG/ML
40 INJECTION SUBCUTANEOUS DAILY
Status: DISCONTINUED | OUTPATIENT
Start: 2024-06-03 | End: 2024-06-04 | Stop reason: HOSPADM

## 2024-06-02 RX ORDER — ROCURONIUM BROMIDE 10 MG/ML
INJECTION, SOLUTION INTRAVENOUS PRN
Status: DISCONTINUED | OUTPATIENT
Start: 2024-06-02 | End: 2024-06-02 | Stop reason: SDUPTHER

## 2024-06-02 RX ORDER — FENTANYL CITRATE 50 UG/ML
INJECTION, SOLUTION INTRAMUSCULAR; INTRAVENOUS PRN
Status: DISCONTINUED | OUTPATIENT
Start: 2024-06-02 | End: 2024-06-02 | Stop reason: SDUPTHER

## 2024-06-02 RX ORDER — PROPOFOL 10 MG/ML
INJECTION, EMULSION INTRAVENOUS PRN
Status: DISCONTINUED | OUTPATIENT
Start: 2024-06-02 | End: 2024-06-02 | Stop reason: SDUPTHER

## 2024-06-02 RX ORDER — GLYCOPYRROLATE 0.2 MG/ML
INJECTION INTRAMUSCULAR; INTRAVENOUS PRN
Status: DISCONTINUED | OUTPATIENT
Start: 2024-06-02 | End: 2024-06-02 | Stop reason: SDUPTHER

## 2024-06-02 RX ORDER — BUPIVACAINE HYDROCHLORIDE 5 MG/ML
INJECTION, SOLUTION PERINEURAL PRN
Status: DISCONTINUED | OUTPATIENT
Start: 2024-06-02 | End: 2024-06-02 | Stop reason: ALTCHOICE

## 2024-06-02 RX ORDER — ONDANSETRON 2 MG/ML
INJECTION INTRAMUSCULAR; INTRAVENOUS PRN
Status: DISCONTINUED | OUTPATIENT
Start: 2024-06-02 | End: 2024-06-02 | Stop reason: SDUPTHER

## 2024-06-02 RX ORDER — INDOCYANINE GREEN AND WATER 25 MG
1.25 KIT INJECTION ONCE
Status: CANCELLED | OUTPATIENT
Start: 2024-06-02 | End: 2024-06-02

## 2024-06-02 RX ORDER — DEXAMETHASONE SODIUM PHOSPHATE 10 MG/ML
INJECTION, EMULSION INTRAMUSCULAR; INTRAVENOUS PRN
Status: DISCONTINUED | OUTPATIENT
Start: 2024-06-02 | End: 2024-06-02 | Stop reason: SDUPTHER

## 2024-06-02 RX ORDER — INDOCYANINE GREEN AND WATER 25 MG
KIT INJECTION PRN
Status: DISCONTINUED | OUTPATIENT
Start: 2024-06-02 | End: 2024-06-02 | Stop reason: ALTCHOICE

## 2024-06-02 RX ADMIN — ROCURONIUM BROMIDE 50 MG: 10 INJECTION INTRAVENOUS at 07:44

## 2024-06-02 RX ADMIN — Medication 60 MG: at 07:44

## 2024-06-02 RX ADMIN — SODIUM CHLORIDE, PRESERVATIVE FREE 10 ML: 5 INJECTION INTRAVENOUS at 21:17

## 2024-06-02 RX ADMIN — SODIUM CHLORIDE: 9 INJECTION, SOLUTION INTRAVENOUS at 07:40

## 2024-06-02 RX ADMIN — PIPERACILLIN AND TAZOBACTAM 3375 MG: 3; .375 INJECTION, POWDER, FOR SOLUTION INTRAVENOUS at 15:41

## 2024-06-02 RX ADMIN — SUGAMMADEX 25 MG: 100 INJECTION, SOLUTION INTRAVENOUS at 08:37

## 2024-06-02 RX ADMIN — Medication 100 MCG: at 08:27

## 2024-06-02 RX ADMIN — ONDANSETRON 4 MG: 2 INJECTION INTRAMUSCULAR; INTRAVENOUS at 09:51

## 2024-06-02 RX ADMIN — PROPOFOL 140 MG: 10 INJECTION, EMULSION INTRAVENOUS at 07:44

## 2024-06-02 RX ADMIN — GLYCOPYRROLATE 0.2 MG: 0.2 INJECTION INTRAMUSCULAR; INTRAVENOUS at 08:00

## 2024-06-02 RX ADMIN — PIPERACILLIN AND TAZOBACTAM 3375 MG: 3; .375 INJECTION, POWDER, FOR SOLUTION INTRAVENOUS at 07:40

## 2024-06-02 RX ADMIN — Medication 100 MCG: at 08:16

## 2024-06-02 RX ADMIN — AMLODIPINE BESYLATE 5 MG: 5 TABLET ORAL at 10:08

## 2024-06-02 RX ADMIN — ONDANSETRON 4 MG: 2 INJECTION INTRAMUSCULAR; INTRAVENOUS at 07:50

## 2024-06-02 RX ADMIN — DEXAMETHASONE SODIUM PHOSPHATE 10 MG: 10 INJECTION, EMULSION INTRAMUSCULAR; INTRAVENOUS at 07:50

## 2024-06-02 RX ADMIN — PANTOPRAZOLE SODIUM 40 MG: 40 INJECTION, POWDER, FOR SOLUTION INTRAVENOUS at 10:09

## 2024-06-02 RX ADMIN — HYDROMORPHONE HYDROCHLORIDE 0.5 MG: 1 INJECTION, SOLUTION INTRAMUSCULAR; INTRAVENOUS; SUBCUTANEOUS at 09:51

## 2024-06-02 RX ADMIN — FENTANYL CITRATE 50 MCG: 50 INJECTION, SOLUTION INTRAMUSCULAR; INTRAVENOUS at 08:39

## 2024-06-02 ASSESSMENT — PAIN SCALES - GENERAL
PAINLEVEL_OUTOF10: 0
PAINLEVEL_OUTOF10: 7
PAINLEVEL_OUTOF10: 0
PAINLEVEL_OUTOF10: 0

## 2024-06-02 ASSESSMENT — PAIN DESCRIPTION - ORIENTATION: ORIENTATION: MID

## 2024-06-02 ASSESSMENT — PAIN - FUNCTIONAL ASSESSMENT
PAIN_FUNCTIONAL_ASSESSMENT: NONE - DENIES PAIN
PAIN_FUNCTIONAL_ASSESSMENT: ACTIVITIES ARE NOT PREVENTED

## 2024-06-02 ASSESSMENT — PAIN DESCRIPTION - LOCATION: LOCATION: ABDOMEN

## 2024-06-02 ASSESSMENT — PAIN DESCRIPTION - DESCRIPTORS: DESCRIPTORS: ACHING;SORE

## 2024-06-02 NOTE — OP NOTE
Operative Note      Patient: Leah Vanegas  YOB: 1955  MRN: [id number]    Date of Procedure: 6/2/2024    Pre-Op Diagnosis Codes:     * Choledocholithiasis [K80.50]  Calculus cholecystitis  Post-Op Diagnosis: Same       Procedure(s):  ROBOTIC CHOLECYSTECTOMY with firefly    Surgeon(s):  Heydi Hawkins MD    Assistant:   First Assistant: Kahlil Rodriguez RN    Anesthesia: General    Estimated Blood Loss (mL): Minimal less than 10 mL    Complications: None    Specimens:   ID Type Source Tests Collected by Time Destination   A : gallbladder Tissue Gallbladder SURGICAL PATHOLOGY Heydi Hawkins MD 6/2/2024 0805        Implants:  * No implants in log *      Drains: * No LDAs found *    Findings:  Infection Present At Time Of Surgery (PATOS) (choose all levels that have infection present):  - Organ Space infection (below fascia) present as evidenced by phlegmon  Other Findings: Cholecystitis with gallbladder wall thickening omental necrosis secondary to pancreatitis    Detailed Description of Procedure:   Patient was brought to the operating suite placed supine on the operating table with MaxForce compression devices on the lower extremities.  Anesthesia did note prior to induction of anesthesia the patient's oxygen saturation was 84% on room air.  She had been on 1 L nasal cannula oxygen.  She had undergone ERCP ERS with removal of stones.  She was redosed with Zosyn intravenously.  She was given IC-Green dye.  After induction of general anesthesia patient's abdomen was prepped and draped.  Timeout was performed.    Skin incision was made below the umbilicus dissection was carried down the fascia fascia was elevated.  Fascia was incised.  12 mm port was inserted with a reducer and CO2 pneumoperitoneum was introduced followed by the camera.  Patient had quite a low-lying liver.  Gallbladder had chronically thickened wall and was distended.  Additional 8 mm ports were placed under direct visualization on

## 2024-06-02 NOTE — PLAN OF CARE
Problem: Discharge Planning  Goal: Discharge to home or other facility with appropriate resources  Outcome: Progressing  Note: Pt will discharge home when appropriate.       Problem: ABCDS Injury Assessment  Goal: Absence of physical injury  Outcome: Progressing  Note: Absence of physical injury.       Problem: Pain  Goal: Verbalizes/displays adequate comfort level or baseline comfort level  Outcome: Progressing  Note: Pt denies pain on my shift. Non pharmaceutical interventions like ice and repositioning offered before pain medications. Will continue to assess.     Pt verbalizes understanding of care plan. Pt contributes to goal settings.

## 2024-06-03 ENCOUNTER — APPOINTMENT (OUTPATIENT)
Dept: INTERVENTIONAL RADIOLOGY/VASCULAR | Age: 69
DRG: 417 | End: 2024-06-03
Attending: STUDENT IN AN ORGANIZED HEALTH CARE EDUCATION/TRAINING PROGRAM
Payer: MEDICARE

## 2024-06-03 ENCOUNTER — APPOINTMENT (OUTPATIENT)
Dept: GENERAL RADIOLOGY | Age: 69
DRG: 417 | End: 2024-06-03
Attending: STUDENT IN AN ORGANIZED HEALTH CARE EDUCATION/TRAINING PROGRAM
Payer: MEDICARE

## 2024-06-03 LAB
ALBUMIN SERPL BCG-MCNC: 3.6 G/DL (ref 3.5–5.1)
ALP SERPL-CCNC: 97 U/L (ref 38–126)
ALT SERPL W/O P-5'-P-CCNC: 60 U/L (ref 11–66)
ANION GAP SERPL CALC-SCNC: 10 MEQ/L (ref 8–16)
AST SERPL-CCNC: 24 U/L (ref 5–40)
BILIRUB CONJ SERPL-MCNC: < 0.2 MG/DL (ref 0–0.3)
BILIRUB SERPL-MCNC: 0.5 MG/DL (ref 0.3–1.2)
BUN SERPL-MCNC: 11 MG/DL (ref 7–22)
CALCIUM SERPL-MCNC: 8.1 MG/DL (ref 8.5–10.5)
CHLORIDE SERPL-SCNC: 103 MEQ/L (ref 98–111)
CO2 SERPL-SCNC: 31 MEQ/L (ref 23–33)
CREAT SERPL-MCNC: 0.4 MG/DL (ref 0.4–1.2)
DEPRECATED RDW RBC AUTO: 47 FL (ref 35–45)
ECHO BSA: 1.62 M2
ERYTHROCYTE [DISTWIDTH] IN BLOOD BY AUTOMATED COUNT: 12.7 % (ref 11.5–14.5)
GFR SERPL CREATININE-BSD FRML MDRD: > 90 ML/MIN/1.73M2
GLUCOSE SERPL-MCNC: 107 MG/DL (ref 70–108)
HCT VFR BLD AUTO: 39 % (ref 37–47)
HGB BLD-MCNC: 12.5 GM/DL (ref 12–16)
MCH RBC QN AUTO: 32.2 PG (ref 26–33)
MCHC RBC AUTO-ENTMCNC: 32.1 GM/DL (ref 32.2–35.5)
MCV RBC AUTO: 100.5 FL (ref 81–99)
PLATELET # BLD AUTO: 271 THOU/MM3 (ref 130–400)
PMV BLD AUTO: 10 FL (ref 9.4–12.4)
POTASSIUM SERPL-SCNC: 4.2 MEQ/L (ref 3.5–5.2)
PROT SERPL-MCNC: 6.5 G/DL (ref 6.1–8)
RBC # BLD AUTO: 3.88 MILL/MM3 (ref 4.2–5.4)
SODIUM SERPL-SCNC: 144 MEQ/L (ref 135–145)
WBC # BLD AUTO: 16.2 THOU/MM3 (ref 4.8–10.8)

## 2024-06-03 PROCEDURE — 80048 BASIC METABOLIC PNL TOTAL CA: CPT

## 2024-06-03 PROCEDURE — 71046 X-RAY EXAM CHEST 2 VIEWS: CPT

## 2024-06-03 PROCEDURE — C9113 INJ PANTOPRAZOLE SODIUM, VIA: HCPCS | Performed by: SURGERY

## 2024-06-03 PROCEDURE — 80076 HEPATIC FUNCTION PANEL: CPT

## 2024-06-03 PROCEDURE — 2580000003 HC RX 258: Performed by: SURGERY

## 2024-06-03 PROCEDURE — 6360000002 HC RX W HCPCS: Performed by: SURGERY

## 2024-06-03 PROCEDURE — 99024 POSTOP FOLLOW-UP VISIT: CPT | Performed by: SURGERY

## 2024-06-03 PROCEDURE — 85027 COMPLETE CBC AUTOMATED: CPT

## 2024-06-03 PROCEDURE — 93971 EXTREMITY STUDY: CPT

## 2024-06-03 PROCEDURE — 99233 SBSQ HOSP IP/OBS HIGH 50: CPT | Performed by: NURSE PRACTITIONER

## 2024-06-03 PROCEDURE — 36415 COLL VENOUS BLD VENIPUNCTURE: CPT

## 2024-06-03 PROCEDURE — 6360000002 HC RX W HCPCS: Performed by: NURSE PRACTITIONER

## 2024-06-03 PROCEDURE — 6370000000 HC RX 637 (ALT 250 FOR IP): Performed by: SURGERY

## 2024-06-03 PROCEDURE — 1200000000 HC SEMI PRIVATE

## 2024-06-03 RX ORDER — HYDROCODONE BITARTRATE AND ACETAMINOPHEN 5; 325 MG/1; MG/1
2 TABLET ORAL EVERY 4 HOURS PRN
Status: DISCONTINUED | OUTPATIENT
Start: 2024-06-03 | End: 2024-06-04 | Stop reason: HOSPADM

## 2024-06-03 RX ORDER — FUROSEMIDE 10 MG/ML
20 INJECTION INTRAMUSCULAR; INTRAVENOUS ONCE
Status: COMPLETED | OUTPATIENT
Start: 2024-06-03 | End: 2024-06-03

## 2024-06-03 RX ORDER — HYDROCODONE BITARTRATE AND ACETAMINOPHEN 5; 325 MG/1; MG/1
1 TABLET ORAL EVERY 4 HOURS PRN
Status: DISCONTINUED | OUTPATIENT
Start: 2024-06-03 | End: 2024-06-04 | Stop reason: HOSPADM

## 2024-06-03 RX ADMIN — PIPERACILLIN AND TAZOBACTAM 3375 MG: 3; .375 INJECTION, POWDER, FOR SOLUTION INTRAVENOUS at 23:18

## 2024-06-03 RX ADMIN — PIPERACILLIN AND TAZOBACTAM 3375 MG: 3; .375 INJECTION, POWDER, FOR SOLUTION INTRAVENOUS at 15:39

## 2024-06-03 RX ADMIN — ENOXAPARIN SODIUM 40 MG: 100 INJECTION SUBCUTANEOUS at 08:05

## 2024-06-03 RX ADMIN — PIPERACILLIN AND TAZOBACTAM 3375 MG: 3; .375 INJECTION, POWDER, FOR SOLUTION INTRAVENOUS at 00:17

## 2024-06-03 RX ADMIN — AMLODIPINE BESYLATE 5 MG: 5 TABLET ORAL at 08:05

## 2024-06-03 RX ADMIN — PIPERACILLIN AND TAZOBACTAM 3375 MG: 3; .375 INJECTION, POWDER, FOR SOLUTION INTRAVENOUS at 08:02

## 2024-06-03 RX ADMIN — PANTOPRAZOLE SODIUM 40 MG: 40 INJECTION, POWDER, FOR SOLUTION INTRAVENOUS at 08:05

## 2024-06-03 RX ADMIN — FUROSEMIDE 20 MG: 10 INJECTION, SOLUTION INTRAMUSCULAR; INTRAVENOUS at 13:27

## 2024-06-04 ENCOUNTER — APPOINTMENT (OUTPATIENT)
Dept: INTERVENTIONAL RADIOLOGY/VASCULAR | Age: 69
DRG: 417 | End: 2024-06-04
Attending: NURSE PRACTITIONER
Payer: MEDICARE

## 2024-06-04 VITALS
RESPIRATION RATE: 18 BRPM | BODY MASS INDEX: 31.14 KG/M2 | HEART RATE: 86 BPM | TEMPERATURE: 98.3 F | SYSTOLIC BLOOD PRESSURE: 140 MMHG | WEIGHT: 158.6 LBS | DIASTOLIC BLOOD PRESSURE: 85 MMHG | OXYGEN SATURATION: 97 % | HEIGHT: 60 IN

## 2024-06-04 LAB
ANION GAP SERPL CALC-SCNC: 12 MEQ/L (ref 8–16)
BUN SERPL-MCNC: 9 MG/DL (ref 7–22)
CALCIUM SERPL-MCNC: 7.8 MG/DL (ref 8.5–10.5)
CHLORIDE SERPL-SCNC: 102 MEQ/L (ref 98–111)
CO2 SERPL-SCNC: 28 MEQ/L (ref 23–33)
CREAT SERPL-MCNC: 0.4 MG/DL (ref 0.4–1.2)
DEPRECATED RDW RBC AUTO: 44.1 FL (ref 35–45)
ECHO BSA: 1.62 M2
ERYTHROCYTE [DISTWIDTH] IN BLOOD BY AUTOMATED COUNT: 12.4 % (ref 11.5–14.5)
GFR SERPL CREATININE-BSD FRML MDRD: > 90 ML/MIN/1.73M2
GLUCOSE SERPL-MCNC: 98 MG/DL (ref 70–108)
HCT VFR BLD AUTO: 37.9 % (ref 37–47)
HGB BLD-MCNC: 12.5 GM/DL (ref 12–16)
MAGNESIUM SERPL-MCNC: 1.9 MG/DL (ref 1.6–2.4)
MCH RBC QN AUTO: 32.1 PG (ref 26–33)
MCHC RBC AUTO-ENTMCNC: 33 GM/DL (ref 32.2–35.5)
MCV RBC AUTO: 97.2 FL (ref 81–99)
PLATELET # BLD AUTO: 287 THOU/MM3 (ref 130–400)
PMV BLD AUTO: 10.2 FL (ref 9.4–12.4)
POTASSIUM SERPL-SCNC: 3 MEQ/L (ref 3.5–5.2)
POTASSIUM SERPL-SCNC: 3.5 MEQ/L (ref 3.5–5.2)
RBC # BLD AUTO: 3.9 MILL/MM3 (ref 4.2–5.4)
SODIUM SERPL-SCNC: 142 MEQ/L (ref 135–145)
WBC # BLD AUTO: 15.4 THOU/MM3 (ref 4.8–10.8)

## 2024-06-04 PROCEDURE — 6370000000 HC RX 637 (ALT 250 FOR IP): Performed by: SURGERY

## 2024-06-04 PROCEDURE — 99239 HOSP IP/OBS DSCHRG MGMT >30: CPT | Performed by: NURSE PRACTITIONER

## 2024-06-04 PROCEDURE — 84132 ASSAY OF SERUM POTASSIUM: CPT

## 2024-06-04 PROCEDURE — 93970 EXTREMITY STUDY: CPT

## 2024-06-04 PROCEDURE — C9113 INJ PANTOPRAZOLE SODIUM, VIA: HCPCS | Performed by: SURGERY

## 2024-06-04 PROCEDURE — 6360000002 HC RX W HCPCS: Performed by: SURGERY

## 2024-06-04 PROCEDURE — 36415 COLL VENOUS BLD VENIPUNCTURE: CPT

## 2024-06-04 PROCEDURE — 80048 BASIC METABOLIC PNL TOTAL CA: CPT

## 2024-06-04 PROCEDURE — 83735 ASSAY OF MAGNESIUM: CPT

## 2024-06-04 PROCEDURE — 6370000000 HC RX 637 (ALT 250 FOR IP): Performed by: NURSE PRACTITIONER

## 2024-06-04 PROCEDURE — 2580000003 HC RX 258: Performed by: SURGERY

## 2024-06-04 PROCEDURE — 85027 COMPLETE CBC AUTOMATED: CPT

## 2024-06-04 RX ORDER — POTASSIUM CHLORIDE 20 MEQ/1
40 TABLET, EXTENDED RELEASE ORAL ONCE
Status: COMPLETED | OUTPATIENT
Start: 2024-06-04 | End: 2024-06-04

## 2024-06-04 RX ORDER — POTASSIUM CHLORIDE 20 MEQ/1
20 TABLET, EXTENDED RELEASE ORAL ONCE
Status: COMPLETED | OUTPATIENT
Start: 2024-06-04 | End: 2024-06-04

## 2024-06-04 RX ORDER — PANTOPRAZOLE SODIUM 40 MG/1
40 TABLET, DELAYED RELEASE ORAL
Status: DISCONTINUED | OUTPATIENT
Start: 2024-06-05 | End: 2024-06-04 | Stop reason: HOSPADM

## 2024-06-04 RX ORDER — POTASSIUM CHLORIDE 20 MEQ/1
20 TABLET, EXTENDED RELEASE ORAL DAILY
Qty: 7 TABLET | Refills: 0 | Status: SHIPPED | OUTPATIENT
Start: 2024-06-04

## 2024-06-04 RX ORDER — PANTOPRAZOLE SODIUM 40 MG/1
40 TABLET, DELAYED RELEASE ORAL
Qty: 30 TABLET | Refills: 0 | Status: SHIPPED | OUTPATIENT
Start: 2024-06-05

## 2024-06-04 RX ADMIN — POTASSIUM CHLORIDE 20 MEQ: 1500 TABLET, EXTENDED RELEASE ORAL at 14:16

## 2024-06-04 RX ADMIN — PIPERACILLIN AND TAZOBACTAM 3375 MG: 3; .375 INJECTION, POWDER, FOR SOLUTION INTRAVENOUS at 08:21

## 2024-06-04 RX ADMIN — PANTOPRAZOLE SODIUM 40 MG: 40 INJECTION, POWDER, FOR SOLUTION INTRAVENOUS at 08:20

## 2024-06-04 RX ADMIN — ENOXAPARIN SODIUM 40 MG: 100 INJECTION SUBCUTANEOUS at 08:21

## 2024-06-04 RX ADMIN — POTASSIUM CHLORIDE 40 MEQ: 1500 TABLET, EXTENDED RELEASE ORAL at 08:20

## 2024-06-04 RX ADMIN — AMLODIPINE BESYLATE 5 MG: 5 TABLET ORAL at 08:20

## 2024-06-04 RX ADMIN — SODIUM CHLORIDE, PRESERVATIVE FREE 10 ML: 5 INJECTION INTRAVENOUS at 08:21

## 2024-06-04 NOTE — DISCHARGE SUMMARY
[address] [facility], [facility] [address]      Phone: [phone]   pantoprazole 40 MG tablet  potassium chloride 20 MEQ extended release tablet         Time Spent on discharge is more than 45 minutes in the examination, evaluation, counseling and review of medications and discharge plan.    Signed:    Thank you Florence Lima MD for the opportunity to be involved in this patient's care.    Electronically signed by LORIN Roth CNP on 6/4/2024 at 1:52 PM

## 2024-06-04 NOTE — DISCHARGE INSTRUCTIONS
Diet: Regular, please avoid greasy, fatty, greasy foods until completely    Please continue to exercise your lungs by using incentive spirometry and Acapella and increase your activity

## 2024-06-04 NOTE — PLAN OF CARE
Problem: Discharge Planning  Goal: Discharge to home or other facility with appropriate resources  Outcome: Adequate for Discharge     Problem: ABCDS Injury Assessment  Goal: Absence of physical injury  Outcome: Adequate for Discharge     Problem: Pain  Goal: Verbalizes/displays adequate comfort level or baseline comfort level  Outcome: Adequate for Discharge

## 2024-06-04 NOTE — PROGRESS NOTES
[facility] Adult Intravenous to Oral Protocol    Pantoprazole changed to PO per [facility] P&T IV to PO protocol.    Patient meets criteria based on the following:  Tolerating diet more advanced than clear liquids: Yes  Tolerating other oral medications: Yes  Not on vasopressors: Yes  No nausea/vomiting within past 24 hours: Yes  No active GI bleed:  Yes  No gastrectomy, gastric outlet or bowel obstruction, ileus, malabsorption syndrome: Yes    Thank you,  Joelle GilD, BCPS 6/4/2024 11:04 AM      
              Hospitalist Progress Note    Patient:  Leah Vanegas      Unit/Bed:8B-37/037-A    YOB: 1955    MRN: [id number]       Acct: [id number]     PCP: Florence Lima MD    Date of Admission: 5/28/2024    Assessment/Plan:    Acute choledocholithiasis S/P robotic cholecystectomy with firefly on June 2, 2024--MRCP with common bile duct stones and ductal dilation, appreciate GI input and they signed off on 6/1, n.p.o.; had ERCP done on May 31 with 7 stones removed; Zosyn from 5/29; appreciate surgery input  Acute biliary pancreatitis--lipase greater than 3000 on admission and at 1003 on 5/31; appreciate GI and surgery input, had robotic cholecystectomy with firefly on 6/2  Volume overload--likely secondary to IV fluids from #2, gave Lasix 20 mg IV push x 1 dose on 6/1 and will repeat on 6/3, no output documented; patient stated she urinated \"a lot\", lung sounds greatly improved and no tachypnea noted; has been on room air  Hypokalemia--likely induced by diuretics, will give Klor-Con 40 milliequivalents now, repeat potassium at noon and replace accordingly  Bilateral lower extremity edema with right greater than--check venous Doppler  Acute/subacute superficial vein thrombosis involving a short segment of the cephalic vein in the left forearm, not present on arrival--possibly secondary to IV, warm compresses and elevate  Elevated LFT--resolved  Leukocytosis--trending down, afebrile; monitor  Hyperkalemia--resolved  Primary hypertension, uncontrolled--Norvasc, monitor  Hypocalcemia--likely secondary to hypoalbuminemia, corrected calcium value at 8.4  Bilateral pleural effusions with left greater than right/lower lobe atelectasis--incentive spirometry and Acapella; Lasix 20 mg x 1 dose on 6/1, 6/3  Minimal right upper quadrant ascites--likely secondary to #1, #2      Expected discharge date: Possibly 6/4    Disposition:    [x] Home       [] TCU       [] Rehab       [] Psych       [] SNF       [] Long 
              Hospitalist Progress Note    Patient:  Leah Vanegas      Unit/Bed:8B-37/037-A    YOB: 1955    MRN: [id number]       Acct: [id number]     PCP: Florence Lima MD    Date of Admission: 5/28/2024    Assessment/Plan:    Acute choledocholithiasis--MRCP with common bile duct stones and ductal dilation, appreciate GI input, n.p.o.; planning ERCP  Acute biliary pancreatitis--lipase greater than 3000 on admission; appreciate GI and surgery input, currently has LR infusing at 175 an hour and discussed with GI secondary to her effusions and decreasing the rate to 100 and mils an hour; MRCP showed cholelithiasis with common bile duct stones and ductal dilation; planning ERCP  Elevated LFT--increased today, total bili at 1.2 (0.6)   Leukocytosis--trending up; monitor  Hyperkalemia--resolved  Elevated LFTs--trending down nicely; likely secondary to #1, #2  Primary hypertension, uncontrolled--Norvasc, monitor  Hypocalcemia--likely secondary to hypoalbuminemia, corrected calcium value at 8.5  Bilateral pleural effusions/lower lobe atelectasis--add incentive spirometry and Acapella; IV fluids decreased to 100 and mils an hour  Minimal right upper quadrant ascites      Expected discharge date: Pending clinical course    Disposition:    [x] Home       [] TCU       [] Rehab       [] Psych       [] SNF       [] Long Term Care Facility       [] Other-    Chief Complaint: Abdominal pain    Hospital Course: Per H&P done May 28, 2024: \"Leah is a 68 y/o  female with a PMHx of HTN who presents to [facility] via direct admit today for the evaluation of severe abdominal pain. Pt was recently admitted last week for abdominal pain at [facility]. She was discharged home, then presented to ED today due to worsening pain. Pain reportedly started in her low back for several days prior to the abdominal pain. Pt states pain is located in RUQ, epigastric region, with some radiation to the left side of her abdomen. Pain 
              Hospitalist Progress Note    Patient:  Leah Vanegas      Unit/Bed:8B-37/037-A    YOB: 1955    MRN: [id number]       Acct: [id number]     PCP: Florence Lima MD    Date of Admission: 5/28/2024    Assessment/Plan:    Acute choledocholithiasis S/P robotic cholecystectomy with firefly on June 2, 2024--MRCP with common bile duct stones and ductal dilation, appreciate GI input and they signed off on 6/1, n.p.o.; had ERCP done on May 31 with 7 stones removed; Zosyn from 5/29; appreciate surgery input  Acute biliary pancreatitis--lipase greater than 3000 on admission and at 1003 on 5/31; appreciate GI and surgery input, had robotic cholecystectomy with firefly on 6/2  Volume overload--likely secondary to IV fluids from #2, gave Lasix 20 mg IV push x 1 dose on 6/1 and will repeat on 6/3, no output documented; patient stated she urinated \"a lot\", lung sounds greatly improved and no tachypnea noted  Acute/subacute superficial vein thrombosis involving a short segment of the cephalic vein in the left forearm, not present on arrival--possibly secondary to IV, warm compresses and elevate  Elevated LFT--resolved  Leukocytosis--trending up to 16.2, afebrile; monitor  Hyperkalemia--resolved  Primary hypertension, uncontrolled--Norvasc, monitor  Hypocalcemia--likely secondary to hypoalbuminemia, corrected calcium value at 8.4  Bilateral pleural effusions with left greater than right/lower lobe atelectasis--incentive spirometry and Acapella; Lasix 20 mg x 1 dose on 6/1, repeat on 6/3  Minimal right upper quadrant ascites--likely secondary to #1, #2      Expected discharge date: Possibly 6/4    Disposition:    [x] Home       [] TCU       [] Rehab       [] Psych       [] SNF       [] Long Term Care Facility       [] Other-    Chief Complaint: Abdominal pain    Hospital Course: Per H&P done May 28, 2024: \"Leah is a 70 y/o  female with a PMHx of HTN who presents to [facility] via direct admit today for 
  [facility]  General Surgery - Heydi Hawkins MD  Daily Progress Note  Pt Name: Leah Vanegas  Medical Record Number: [id number]  Date of Birth 1955   Today's Date: 2024    ASSESSMENT:   Hospital day # 2   Choledocholithiasis  Cholelithiasis  Biliary pancreatitis   has no past medical history on file.  RECOMMENDATIONS:   IV hydration  Analgesics and antiemetics as needed  GI following-ERCP recommended.  Follow-up MRCP here confirmed multiple common duct stones  DVT prophylaxis per primary service.  Hold prior to intervention.  Activity as tolerated     Leah is hemodynamically stable.  Still some upper abdominal tenderness to palpation.  No rebound or guarding.  Repeat MRCP discussed with patient.  Multiple common duct stones.  Timing of and recommendations by GI are pending.  MEDICATIONS   Scheduled Meds:   pantoprazole  40 mg IntraVENous Daily    amLODIPine  5 mg Oral Daily    sodium chloride flush  10 mL IntraVENous 2 times per day    [Held by provider] enoxaparin  40 mg SubCUTAneous Daily    piperacillin-tazobactam  3,375 mg IntraVENous Q8H     Continuous Infusions:   lactated ringers IV soln 175 mL/hr at 24 0409    sodium chloride       PRN Meds:.sodium chloride flush, sodium chloride, potassium chloride **OR** potassium alternative oral replacement **OR** potassium chloride, magnesium sulfate, ondansetron **OR** ondansetron, polyethylene glycol, acetaminophen **OR** acetaminophen, HYDROmorphone **OR** HYDROmorphone  OBJECTIVE   CURRENT VITALS:  height is 1.524 m (5') and weight is 65.6 kg (144 lb 11.2 oz). Her oral temperature is 97.9 °F (36.6 °C). Her blood pressure is 135/68 and her pulse is 88. Her respiration is 16 and oxygen saturation is 90%.   Temperature Range (24h):Temp: 97.9 °F (36.6 °C) Temp  Av °F (36.7 °C)  Min: 97.4 °F (36.3 °C)  Max: 98.9 °F (37.2 °C)  BP Range (24h): Systolic (24hrs), Av , Min:127 , Max:141     Diastolic (24hrs), Av, Min:68, Max:84    Pulse 
  [facility]  General Surgery - Heydi Hawkins MD  Daily Progress Note  Pt Name: Leah Vanegas  Medical Record Number: [id number]  Date of Birth 1955   Today's Date: 6/3/2024    ASSESSMENT:   Hospital day # 6 postoperative day #1 robotic assisted laparoscopic cholecystectomy  Choledocholithiasis status post ERCP/ERS multiple stones removed  Cholelithiasis  Biliary pancreatitis   has no past medical history on file.  Hypoxemia remains on oxygen.  Patient prior to surgery was 84% on room air  RECOMMENDATIONS:   IV hydration-per primary service  Analgesics and antiemetics as needed  DVT prophylaxis per primary service.  Chemical prophylaxis resumed  Activity as tolerated   Low-fat diet  6.  May discontinue IV antibiotics from a surgical standpoint.  7.  Chest x-ray ordered follow-up patient continuing to need nasal cannula oxygen  8.  From surgical standpoint when medically stable patient may be discharged home.        Leah is hemodynamically stable.  Patient reports she feels good post cholecystectomy.  Still remains on some oxygen.  Saturation on room air prior to surgery was 84%.  Abdomen is soft only really incisional tenderness.  He is afebrile hemodynamically stable.  MEDICATIONS   Scheduled Meds:   enoxaparin  40 mg SubCUTAneous Daily    pantoprazole  40 mg IntraVENous Daily    amLODIPine  5 mg Oral Daily    sodium chloride flush  10 mL IntraVENous 2 times per day    piperacillin-tazobactam  3,375 mg IntraVENous Q8H     Continuous Infusions:   sodium chloride       PRN Meds:.sodium chloride flush, sodium chloride, potassium chloride **OR** potassium alternative oral replacement **OR** potassium chloride, magnesium sulfate, ondansetron **OR** ondansetron, polyethylene glycol, acetaminophen **OR** acetaminophen, HYDROmorphone **OR** HYDROmorphone  OBJECTIVE   CURRENT VITALS:  height is 1.524 m (5') and weight is 71.9 kg (158 lb 9.6 oz). Her oral temperature is 98 °F (36.7 °C). Her blood pressure is 
  Report from Facility: [facility] ER  Referring Physician: Dr. Coello  Accepting Physician: Dr. Martin/Dr. Behl  Patient Condition:70 yo ( 53) at [facility] ER with c/o worsening abd pain since this AM, along with N/V. Pt was recently admitted to [facility] observation overnight on Friday. Lipase was 66209 at that time. Admitted wiht pancreatitis. Felt better saturday. Lipase down to 1700 and was discharged with OP follow up with surgery. Today Lipase 80252. .  Alk phos 83. Total bili 1.5 direct 0.9. MRCP showed ductal stones. Inflamed pancreas. No fever. No UTI. Vitals stable: 97.7, 63, 18, 120/63, 96%. received IV Zosyn, IVF and Diluadid while in E     
0848  - pt arrives to pacu, respirations unlabored on 4 L NC, pt denies pain, VSS    0855 - pt denies pain    0910-report called to Hafsa MIRELES    0918 - pt meets criteria for discharge from pacu at this time, pt transported to 8AB37 in stable condition, pt family sent to room    
1614: Pt arrives to pacu, awakens to verbal stimuli. Pt on 4LNC, respirations easy and unlabored. VSS  1619: Duoneb administered at this time  1635: Pt alert and talking, continues to deny pain  1644: Pt meets criteria for discharge from pacu, awaiting transport  1645: Report given to 8ABRN  1700: No change   1719: Pt transported to 8AB in stable condition. VSS  
ERCP completed. Tolerated well. Photos taken. No biopsies. Sphincterotomy and 9-12mm balloon sweep with stone removal. Conray dye- LOT#[id number] and EXP-02/2026 6 mls used. Scope [id number].  
Gastroenterology Progress Note:     Patient Name:  Leah Vanegas   MRN: [id number]  [id number]  YOB: 1955  Admit Date: 5/28/2024  6:13 PM  Primary Care Physician: Florence Lima MD   8B-37/037-A     Patient seen and examined.  24 hours events and chart reviewed.    Subjective: Patient status ERCP with stone removal by Dr. Amanda. Markedly improved HFP, slightly elevated ALT. Will start GI bland diet today,currently on clears. Plan for Cholecystectomy tomorrow per Dr. Hawkins.     Objective:  /65   Pulse 72   Temp 98 °F (36.7 °C) (Oral)   Resp 16   Ht 1.524 m (5')   Wt 70.4 kg (155 lb 1.6 oz)   SpO2 98%   BMI 30.29 kg/m²     Physical Exam:    General:  Nourished in no distress  HEENT: Atraumatic, normocephalic. Moist oral mucous membranes.  Neck: Supple without adenopathy, JVD, thyromegaly or masses. Trachea midline.  CV: Heart RRR, no murmurs, rubs, gallops.  Resp: Even, easy without cough or accessory use. Lungs clear to ascultation bilaterally.   Abd: Round, soft, mildly tender to bilateral upper abdomen with palpation. No hepatosplenomegaly or mass present. Active bowel sounds heard. No distention noted.   Ext:  Without cyanosis, clubbing, edema.   Skin: Pink, warm, dry  Neuro:  Alert, oriented x 3 with no obvious deficits.       Rectal: deferred    Labs:   CBC:   Lab Results   Component Value Date/Time    WBC 12.3 06/01/2024 05:23 AM    HGB 11.2 06/01/2024 05:23 AM    HCT 34.3 06/01/2024 05:23 AM    .6 06/01/2024 05:23 AM     06/01/2024 05:23 AM     BMP:   Lab Results   Component Value Date/Time     06/01/2024 05:23 AM    K 4.1 06/01/2024 05:23 AM     06/01/2024 05:23 AM    CO2 28 06/01/2024 05:23 AM    BUN 11 06/01/2024 05:23 AM    CREATININE 0.3 06/01/2024 05:23 AM    CALCIUM 7.8 06/01/2024 05:23 AM     PT/INR:   Lab Results   Component Value Date/Time    INR 1.16 05/29/2024 08:48 AM     Lipids:   Lab Results   Component Value Date/Time    ALKPHOS 118 06/01/2024 
Gastroenterology Progress Note:     Patient Name:  Leah Vanegas   MRN: [id number]  [id number]  YOB: 1955  Admit Date: 5/28/2024  6:13 PM  Primary Care Physician: Florence Lima MD   8B-37/037-A     Patient seen and examined.  24 hours events and chart reviewed.    Subjective: Patient sitting on the side of the bed. She states her abdominal pain is somewhat improved from yesterday & is worse when moving around. Denies n/v. MRCP results reviewed.    Objective:  BP (!) 142/79   Pulse 87   Temp 97.5 °F (36.4 °C) (Oral)   Resp 18   Ht 1.524 m (5')   Wt 65.6 kg (144 lb 11.2 oz)   SpO2 96%   BMI 28.26 kg/m²     Physical Exam:    General:  Nourished in no distress  HEENT: Atraumatic, normocephalic. Moist oral mucous membranes.  Neck: Supple without adenopathy, JVD, thyromegaly or masses. Trachea midline.  CV: Heart RRR, no murmurs, rubs, gallops.  Resp: Even, easy without cough or accessory use. Lungs clear to ascultation bilaterally.   Abd: Round, soft, mildly tender to bilateral upper abdomen with palpation. No hepatosplenomegaly or mass present. Active bowel sounds heard. No distention noted.   Ext:  Without cyanosis, clubbing, edema.   Skin: Pink, warm, dry  Neuro:  Alert, oriented x 3 with no obvious deficits.       Rectal: deferred    Labs:   CBC:   Lab Results   Component Value Date/Time    WBC 20.0 05/30/2024 05:29 AM    HGB 13.2 05/30/2024 05:29 AM    HCT 40.5 05/30/2024 05:29 AM    MCV 99.5 05/30/2024 05:29 AM     05/30/2024 05:29 AM     BMP:   Lab Results   Component Value Date/Time     05/30/2024 05:29 AM    K 3.6 05/30/2024 05:29 AM     05/30/2024 05:29 AM    CO2 26 05/30/2024 05:29 AM    BUN 13 05/30/2024 05:29 AM    CREATININE 0.4 05/30/2024 05:29 AM    CALCIUM 7.7 05/30/2024 05:29 AM     PT/INR:   Lab Results   Component Value Date/Time    INR 1.16 05/29/2024 08:48 AM     Lipids:   Lab Results   Component Value Date/Time    ALKPHOS 158 05/30/2024 05:29 AM     
Patient educated on how to use incentive spirometer. Patient verbalized understanding and demonstrated proper use. Emphasized importance and usage of device, with coughing and deep breathing every 4 hours while awake.         
(24h):Temp: 97.5 °F (36.4 °C) Temp  Av °F (36.7 °C)  Min: 97.5 °F (36.4 °C)  Max: 98.6 °F (37 °C)  BP Range (24h): Systolic (24hrs), Av , Min:111 , Max:159     Diastolic (24hrs), Av, Min:61, Max:83    Pulse Range (24h): Pulse  Av.2  Min: 64  Max: 100  Respiration Range (24h): Resp  Av.2  Min: 16  Max: 22  Current Pulse Ox (24h):  SpO2: 93 %  Pulse Ox Range (24h):  SpO2  Av.7 %  Min: 92 %  Max: 97 %  Oxygen Amount and Delivery: O2 Flow Rate (L/min): 3 L/min  Incentive Spirometry Tx:       Achieved Volume (mL): 1000 mL    GENERAL: alert, no distress  LUNGS: Clear no audible wheezing  HEART: normal rate  ABDOMEN: non-distended, mild upper abdominal tenderness on palpation, and no guarding or peritoneal signs  EXTREMITY: no cyanosis, clubbing or edema  In: 910 [I.V.:910]  Out: -          LABS     Recent Labs     24  1222 24  0529 24  0523   WBC  --  20.0* 12.3*   HGB  --  13.2 11.2*   HCT  --  40.5 34.3*   PLT  --  212 202   NA  --  141 141   K 4.3 3.6 4.1   CL  --  105 105   CO2  --  26 28   BUN  --  13 11   CREATININE  --  0.4 0.3*   CALCIUM  --  7.7* 7.8*      Recent Labs     24  0848   INR 1.16*     Recent Labs     24  0529 24  0529 24  0523   * 195* 31   * 214* 82*   BILITOT 1.2 1.1 0.6   BILIDIR  --  0.5* <0.2   LIPASE  --  1,003.4*  --      No results for input(s): \"TROPONINT\" in the last 72 hours.    New labs reviewed  RADIOLOGY     FLUORO FOR SURGICAL PROCEDURES   Final Result      MRI ABDOMEN W WO CONTRAST MRCP   Final Result   Impression:      Study limited by body habitus and motion artifact      Cholelithiasis with common duct stones and ductal dilation      Bilateral pleural effusions and lower lobe atelectasis      Minimal right upper quadrant ascites      This document has been electronically signed by: Blair Lehman MD on    2024 07:26 PM      XR CHEST PORTABLE   Final Result   Mild left lower lobe 
(24hrs), Av , Min:122 , Max:148     Diastolic (24hrs), Av, Min:64, Max:80    Pulse Range (24h): Pulse  Av.2  Min: 86  Max: 91  Respiration Range (24h): Resp  Av  Min: 16  Max: 18  Current Pulse Ox (24h):  SpO2: 92 %  Pulse Ox Range (24h):  SpO2  Av.8 %  Min: 91 %  Max: 96 %  Oxygen Amount and Delivery: O2 Flow Rate (L/min): 1 L/min  Incentive Spirometry Tx:       Achieved Volume (mL): 1000 mL    GENERAL: alert, no distress  LUNGS: Clear no audible wheezing  HEART: normal rate  ABDOMEN: non-distended, mild upper abdominal tenderness on palpation, and no guarding or peritoneal signs  EXTREMITY: no cyanosis, clubbing or edema  No intake/output data recorded.     Date 24 0000 - 24 2359   Shift 7544-1105 6706-6749 9344-0193 24 Hour Total   INTAKE   P.O.(mL/kg/hr) 0(0)   0   Shift Total(mL/kg) 0(0)   0(0)   OUTPUT   Shift Total(mL/kg)       Weight (kg) 68.6 68.6 68.6 68.6     LABS     Recent Labs     24  0739 24  1222 24  0529   WBC 20.5* 17.5*  --   --  20.0*   HGB 15.5 15.0  --   --  13.2   HCT 47.0 45.2  --   --  40.5    234  --   --  212     --  144  --  141   K 4.7  --  5.5* 4.3 3.6     --  107  --  105   CO2 26  --  27  --  26   BUN 16  --  16  --  13   CREATININE 0.5  --  0.6  --  0.4   CALCIUM 9.3  --  8.0*  --  7.7*      Recent Labs     24  0848   INR 1.16*     Recent Labs     24  0724  0529 24  0529   * 69* 179*  --    * 161* 212*  --    BILITOT 0.7 0.6 1.2  --    LIPASE >3,000.0*  --   --  1,003.4*     No results for input(s): \"TROPONINT\" in the last 72 hours.    New labs reviewed  RADIOLOGY     MRI ABDOMEN W WO CONTRAST MRCP   Final Result   Impression:      Study limited by body habitus and motion artifact      Cholelithiasis with common duct stones and ductal dilation      Bilateral pleural effusions and lower lobe atelectasis      Minimal right upper 
seconds   Peripheral Pulses: +2 palpable, equal bilaterally       Labs:   Recent Labs     05/28/24 1955 05/29/24  0605   WBC 20.5* 17.5*   HGB 15.5 15.0   HCT 47.0 45.2    234     Recent Labs     05/28/24 1955 05/29/24  0739    144   K 4.7 5.5*    107   CO2 26 27   BUN 16 16   CREATININE 0.5 0.6   CALCIUM 9.3 8.0*     Recent Labs     05/28/24 1955 05/29/24  0739   * 69*   * 161*   BILITOT 0.7 0.6   ALKPHOS 74 70     Microbiology:    2 blood cultures are pending    Radiology:  XR CHEST PORTABLE    Result Date: 5/29/2024  1 view chest x-ray Comparison: None Findings: Mild left lower lobe atelectasis. No significant pleural effusion or pneumothorax. Prominent cardiac silhouette. Chronic appearing left-sided rib fractures. Prominent levoscoliosis.     Mild left lower lobe atelectasis. This document has been electronically signed by: Celio Mulligan MD on 05/29/2024 01:22 AM      Code Status: Full Code    Tele:   [x] Yes sinus rhythm heart rate 76             [] no    LDA: []CVC / []PICC / []Midline / []Dias / []Drains / []Mediport / [x]None  Antibiotics: Zosyn  Steroids: None  Labs (still needed?): [x]Yes / []No  IVF (still needed?): [x]Yes / []No    Level of care: []Step Down / [x]Med-Surg  Bed Status: [x]Inpatient / []Observation  PT/OT: []Yes / [x]No    DVT Prophylaxis: [x] Lovenox / [] Heparin / [] SCDs / [] Already on Systemic Anticoagulation / [] None       Active Hospital Problems    Diagnosis Date Noted    Choledocholithiasis [K80.50] 05/28/2024       Electronically signed by LORIN Roth CNP on 5/29/2024 at 8:39 AM    
Findings: Mild left lower lobe atelectasis. No significant pleural effusion or pneumothorax. Prominent cardiac silhouette. Chronic appearing left-sided rib fractures. Prominent levoscoliosis.     Mild left lower lobe atelectasis. This document has been electronically signed by: Celio Mulligan MD on 05/29/2024 01:22 AM      Code Status: Full Code    Tele:   [x] Yes sinus rhythm heart rate 90             [] no    LDA: []CVC / []PICC / []Midline / []Dias / []Drains / []Mediport / [x]None  Antibiotics: Zosyn  Steroids: None  Labs (still needed?): [x]Yes / []No  IVF (still needed?): [x]Yes / []No    Level of care: []Step Down / [x]Med-Surg  Bed Status: [x]Inpatient / []Observation  PT/OT: []Yes / [x]No    DVT Prophylaxis: [x] Lovenox / [] Heparin / [] SCDs / [] Already on Systemic Anticoagulation / [] None       Active Hospital Problems    Diagnosis Date Noted    Acute biliary pancreatitis without infection or necrosis [K85.10] 05/29/2024    RUQ pain [R10.11] 05/29/2024    Choledocholithiasis [K80.50] 05/28/2024       Electronically signed by LORIN Rtoh CNP on 5/31/2024 at 7:51 AM    
PORTABLE    Result Date: 5/29/2024  1 view chest x-ray Comparison: None Findings: Mild left lower lobe atelectasis. No significant pleural effusion or pneumothorax. Prominent cardiac silhouette. Chronic appearing left-sided rib fractures. Prominent levoscoliosis.     Mild left lower lobe atelectasis. This document has been electronically signed by: Celio Mulligan MD on 05/29/2024 01:22 AM      Code Status: Full Code    Tele:   [] Yes               [x] no    LDA: []CVC / []PICC / []Midline / []Dias / []Drains / []Mediport / [x]None  Antibiotics: Zosyn  Steroids: None  Labs (still needed?): [x]Yes / []No  IVF (still needed?): []Yes / [x]No    Level of care: []Step Down / [x]Med-Surg  Bed Status: [x]Inpatient / []Observation  PT/OT: []Yes / [x]No    DVT Prophylaxis: [] Lovenox on hold for 48 hours post ERCP/ [] Heparin / [x] SCDs / [] Already on Systemic Anticoagulation / [] None       Active Hospital Problems    Diagnosis Date Noted    Acute biliary pancreatitis without infection or necrosis [K85.10] 05/29/2024    RUQ pain [R10.11] 05/29/2024    Choledocholithiasis [K80.50] 05/28/2024       Electronically signed by LORIN Roth CNP on 6/2/2024 at 8:10 AM    
view chest x-ray Comparison: None Findings: Mild left lower lobe atelectasis. No significant pleural effusion or pneumothorax. Prominent cardiac silhouette. Chronic appearing left-sided rib fractures. Prominent levoscoliosis.     Mild left lower lobe atelectasis. This document has been electronically signed by: Celio Mulligan MD on 05/29/2024 01:22 AM      Code Status: Full Code    Tele:   [] Yes               [x] no    LDA: []CVC / []PICC / []Midline / []Dias / []Drains / []Mediport / [x]None  Antibiotics: Zosyn  Steroids: None  Labs (still needed?): [x]Yes / []No  IVF (still needed?): [x]Yes / []No    Level of care: []Step Down / [x]Med-Surg  Bed Status: [x]Inpatient / []Observation  PT/OT: []Yes / [x]No    DVT Prophylaxis: [] Lovenox on hold for 48 hours post ERCP/ [] Heparin / [x] SCDs / [] Already on Systemic Anticoagulation / [] None       Active Hospital Problems    Diagnosis Date Noted    Acute biliary pancreatitis without infection or necrosis [K85.10] 05/29/2024    RUQ pain [R10.11] 05/29/2024    Choledocholithiasis [K80.50] 05/28/2024       Electronically signed by LORIN Roth CNP on 6/1/2024 at 7:37 AM

## 2024-06-04 NOTE — PLAN OF CARE
Problem: Discharge Planning  Goal: Discharge to home or other facility with appropriate resources  6/4/2024 1700 by Tatianna Gagnon RN  Outcome: Adequate for Discharge  6/4/2024 1622 by Tatianna Gagnon RN  Outcome: Adequate for Discharge     Problem: ABCDS Injury Assessment  Goal: Absence of physical injury  6/4/2024 1700 by Tatianna Gagnon RN  Outcome: Adequate for Discharge  6/4/2024 1622 by Tatianna Gagnon RN  Outcome: Adequate for Discharge     Problem: Pain  Goal: Verbalizes/displays adequate comfort level or baseline comfort level  6/4/2024 1700 by Tatianna Gagnon RN  Outcome: Adequate for Discharge  6/4/2024 1622 by Tatianna Gagnon RN  Outcome: Adequate for Discharge

## 2024-06-04 NOTE — CARE COORDINATION
6/4/24, 12:34 PM EDT    Patient goals/plan/ treatment preferences discussed by  and .  Patient goals/plan/ treatment preferences reviewed with patient/ family.  Patient/ family verbalize understanding of discharge plan and are in agreement with goal/plan/treatment preferences.  Understanding was demonstrated using the teach back method.  AVS provided by RN at time of discharge, which includes all necessary medical information pertaining to the patients current course of illness, treatment, post-discharge goals of care, and treatment preferences.     Services At/After Discharge: None    Potential discharge in next 24 hours. K+ being replaced today. Plans return home alone and she voices no need for services.

## 2024-06-17 ENCOUNTER — OFFICE VISIT (OUTPATIENT)
Dept: SURGERY | Age: 69
End: 2024-06-17

## 2024-06-17 VITALS
TEMPERATURE: 98 F | SYSTOLIC BLOOD PRESSURE: 118 MMHG | HEART RATE: 76 BPM | OXYGEN SATURATION: 96 % | WEIGHT: 128 LBS | RESPIRATION RATE: 16 BRPM | BODY MASS INDEX: 25.13 KG/M2 | HEIGHT: 60 IN | DIASTOLIC BLOOD PRESSURE: 62 MMHG

## 2024-06-17 DIAGNOSIS — K85.10 ACUTE BILIARY PANCREATITIS WITHOUT INFECTION OR NECROSIS: ICD-10-CM

## 2024-06-17 DIAGNOSIS — Z09 POSTOP CHECK: ICD-10-CM

## 2024-06-17 DIAGNOSIS — K80.50 CHOLEDOCHOLITHIASIS: Primary | ICD-10-CM

## 2024-06-17 PROCEDURE — 99024 POSTOP FOLLOW-UP VISIT: CPT | Performed by: SURGERY

## 2024-06-17 NOTE — PROGRESS NOTES
Heydi Hawkins MD   General Surgery  Postprocedure Evaluation in Office  Pt Name: Leah Vanegas  Date of Birth 1955   Today's Date: 6/17/2024  Medical Record Number: [id number]  Primary Care Provider: Florence Lima MD  Chief Complaint   Patient presents with    Post-Op Check     S/p ROBOTIC CHOLECYSTECTOMY with firefly 6/02/24     ASSESSMENT      1. Choledocholithiasis    2. Acute biliary pancreatitis without infection or necrosis    3. Postop check         PLAN     Repeat hepatic function test normal    2. Discussed recommended activity restrictions.  3. Follow up surgical clinic as needed.  SUBJECTIVE     Leah is seen today for post-op follow-up. She  is s/p cholecystectomy. ERS and removal 7 CBD stones. Doing well. Denies chronic diarrhea.  Medications    Current Outpatient Medications:     pantoprazole (PROTONIX) 40 MG tablet, Take 1 tablet by mouth every morning (before breakfast), Disp: 30 tablet, Rfl: 0    amLODIPine (NORVASC) 5 MG tablet, Take 1 tablet by mouth daily, Disp: , Rfl:     Allergies  Allergies   Allergen Reactions    Codeine Nausea Only       Review of Systems  History obtained from the patient.  Constitutional: Denies any fever, chills, fatigue.  Wound: Denies any rash, skin color changes or wound problems.  Resp: Denies any cough, shortness of breath.  CV: Denies any chest pain, orthopnea or syncope.   GI:  Denies any nausea, vomiting, blood in the stool, constipation or diarrhea.   : Denies any hematuria, hesitancy or dysuria.   OBJECTIVE     VITALS: /62 (Site: Right Upper Arm, Position: Sitting, Cuff Size: Medium Adult)   Pulse 76   Temp 98 °F (36.7 °C)   Resp 16   Ht 1.524 m (5')   Wt 58.1 kg (128 lb)   SpO2 96%   BMI 25.00 kg/m²     CONSTITUTIONAL: Alert and oriented times 3, no acute distress and cooperative to examination.  SKIN: Skin color, texture, turgor normal. No rashes or lesions.  INCISION: wound margins intact and healing well.  No signs of infection. No

## (undated) DEVICE — GENERAL LAPAROSCOPY-LF: Brand: MEDLINE INDUSTRIES, INC.

## (undated) DEVICE — ELECTRO LUBE IS A SINGLE PATIENT USE DEVICE THAT IS INTENDED TO BE USED ON ELECTROSURGICAL ELECTRODES TO REDUCE STICKING.: Brand: KEY SURGICAL ELECTRO LUBE

## (undated) DEVICE — RETRIEVAL BALLOON CATHETER: Brand: EXTRACTOR™ PRO RX

## (undated) DEVICE — SEAL

## (undated) DEVICE — ARM DRAPE

## (undated) DEVICE — REDUCER: Brand: ENDOWRIST

## (undated) DEVICE — BASIC SINGLE BASIN BTC-LF: Brand: MEDLINE INDUSTRIES, INC.

## (undated) DEVICE — BAG SPEC REM 224ML W4XL6IN DIA10MM 1 HND GYN DISP ENDOPCH

## (undated) DEVICE — BLADELESS OBTURATOR: Brand: WECK VISTA

## (undated) DEVICE — TROCAR: Brand: KII FIOS FIRST ENTRY

## (undated) DEVICE — TIP COVER ACCESSORY

## (undated) DEVICE — TUBING INSUFFLATOR HEAT HUMIDIFIED SMK EVAC SET PNEUMOCLEAR

## (undated) DEVICE — LIQUIBAND RAPID ADHESIVE 36/CS 0.8ML: Brand: MEDLINE

## (undated) DEVICE — GLOVE SURG 7 PF POLYMER COAT WHT STRL SIGN LTX ESSENTIAL LTX

## (undated) DEVICE — SPHINCTEROTOME: Brand: HYDRATOME RX 44

## (undated) DEVICE — SUTURE ETHIBOND D SPEC NO 0 UR 6 30IN D9436

## (undated) DEVICE — COLUMN DRAPE

## (undated) DEVICE — PUMP SUC IRR TBNG L10FT W/ HNDPC ASSEMB STRYKEFLOW 2

## (undated) DEVICE — GOWN,SIRUS,NON REINFRCD,LARGE,SET IN SL: Brand: MEDLINE